# Patient Record
Sex: MALE | Race: WHITE | NOT HISPANIC OR LATINO | Employment: UNEMPLOYED | URBAN - METROPOLITAN AREA
[De-identification: names, ages, dates, MRNs, and addresses within clinical notes are randomized per-mention and may not be internally consistent; named-entity substitution may affect disease eponyms.]

---

## 2023-10-30 ENCOUNTER — APPOINTMENT (OUTPATIENT)
Dept: GENERAL RADIOLOGY | Facility: HOSPITAL | Age: 68
DRG: 539 | End: 2023-10-30
Payer: MEDICARE

## 2023-10-30 ENCOUNTER — APPOINTMENT (OUTPATIENT)
Dept: CT IMAGING | Facility: HOSPITAL | Age: 68
DRG: 539 | End: 2023-10-30
Payer: MEDICARE

## 2023-10-30 ENCOUNTER — HOSPITAL ENCOUNTER (INPATIENT)
Facility: HOSPITAL | Age: 68
LOS: 6 days | Discharge: NURSING, REHAB OR LONG-TERM CARE | DRG: 539 | End: 2023-11-06
Attending: EMERGENCY MEDICINE | Admitting: INTERNAL MEDICINE
Payer: MEDICARE

## 2023-10-30 DIAGNOSIS — L89.90 PRESSURE INJURY OF SKIN, UNSPECIFIED INJURY STAGE, UNSPECIFIED LOCATION: ICD-10-CM

## 2023-10-30 DIAGNOSIS — T83.511A URINARY TRACT INFECTION ASSOCIATED WITH INDWELLING URETHRAL CATHETER, INITIAL ENCOUNTER: ICD-10-CM

## 2023-10-30 DIAGNOSIS — R41.82 ALTERED MENTAL STATUS, UNSPECIFIED ALTERED MENTAL STATUS TYPE: Primary | ICD-10-CM

## 2023-10-30 DIAGNOSIS — N39.0 URINARY TRACT INFECTION ASSOCIATED WITH INDWELLING URETHRAL CATHETER, INITIAL ENCOUNTER: ICD-10-CM

## 2023-10-30 LAB
ALBUMIN SERPL-MCNC: 2.9 G/DL (ref 3.5–5.2)
ALBUMIN/GLOB SERPL: 0.6 G/DL
ALP SERPL-CCNC: 126 U/L (ref 39–117)
ALT SERPL W P-5'-P-CCNC: 18 U/L (ref 1–41)
ANION GAP SERPL CALCULATED.3IONS-SCNC: 8.7 MMOL/L (ref 5–15)
AST SERPL-CCNC: 28 U/L (ref 1–40)
BACTERIA UR QL AUTO: ABNORMAL /HPF
BASOPHILS # BLD AUTO: 0.07 10*3/MM3 (ref 0–0.2)
BASOPHILS NFR BLD AUTO: 0.7 % (ref 0–1.5)
BILIRUB SERPL-MCNC: 0.4 MG/DL (ref 0–1.2)
BILIRUB UR QL STRIP: NEGATIVE
BUN SERPL-MCNC: 13 MG/DL (ref 8–23)
BUN/CREAT SERPL: 18.8 (ref 7–25)
CALCIUM SPEC-SCNC: 9.3 MG/DL (ref 8.6–10.5)
CHLORIDE SERPL-SCNC: 104 MMOL/L (ref 98–107)
CLARITY UR: ABNORMAL
CO2 SERPL-SCNC: 28.3 MMOL/L (ref 22–29)
COLOR UR: ABNORMAL
CREAT SERPL-MCNC: 0.69 MG/DL (ref 0.76–1.27)
D-LACTATE SERPL-SCNC: 1 MMOL/L (ref 0.5–2)
DEPRECATED RDW RBC AUTO: 46.5 FL (ref 37–54)
EGFRCR SERPLBLD CKD-EPI 2021: 101.4 ML/MIN/1.73
EOSINOPHIL # BLD AUTO: 0.29 10*3/MM3 (ref 0–0.4)
EOSINOPHIL NFR BLD AUTO: 2.9 % (ref 0.3–6.2)
ERYTHROCYTE [DISTWIDTH] IN BLOOD BY AUTOMATED COUNT: 14.5 % (ref 12.3–15.4)
GEN 5 2HR TROPONIN T REFLEX: 38 NG/L
GLOBULIN UR ELPH-MCNC: 4.9 GM/DL
GLUCOSE SERPL-MCNC: 96 MG/DL (ref 65–99)
GLUCOSE UR STRIP-MCNC: NEGATIVE MG/DL
HCT VFR BLD AUTO: 32.5 % (ref 37.5–51)
HGB BLD-MCNC: 10.3 G/DL (ref 13–17.7)
HGB UR QL STRIP.AUTO: ABNORMAL
HYALINE CASTS UR QL AUTO: ABNORMAL /LPF
IMM GRANULOCYTES # BLD AUTO: 0.05 10*3/MM3 (ref 0–0.05)
IMM GRANULOCYTES NFR BLD AUTO: 0.5 % (ref 0–0.5)
KETONES UR QL STRIP: NEGATIVE
LEUKOCYTE ESTERASE UR QL STRIP.AUTO: ABNORMAL
LYMPHOCYTES # BLD AUTO: 2.48 10*3/MM3 (ref 0.7–3.1)
LYMPHOCYTES NFR BLD AUTO: 24.7 % (ref 19.6–45.3)
MCH RBC QN AUTO: 27.8 PG (ref 26.6–33)
MCHC RBC AUTO-ENTMCNC: 31.7 G/DL (ref 31.5–35.7)
MCV RBC AUTO: 87.8 FL (ref 79–97)
MONOCYTES # BLD AUTO: 0.93 10*3/MM3 (ref 0.1–0.9)
MONOCYTES NFR BLD AUTO: 9.3 % (ref 5–12)
NEUTROPHILS NFR BLD AUTO: 6.23 10*3/MM3 (ref 1.7–7)
NEUTROPHILS NFR BLD AUTO: 61.9 % (ref 42.7–76)
NITRITE UR QL STRIP: POSITIVE
NRBC BLD AUTO-RTO: 0 /100 WBC (ref 0–0.2)
PH UR STRIP.AUTO: 7 [PH] (ref 5–8)
PLATELET # BLD AUTO: 300 10*3/MM3 (ref 140–450)
PMV BLD AUTO: 10 FL (ref 6–12)
POTASSIUM SERPL-SCNC: 4.2 MMOL/L (ref 3.5–5.2)
PROT SERPL-MCNC: 7.8 G/DL (ref 6–8.5)
PROT UR QL STRIP: ABNORMAL
QT INTERVAL: 449 MS
QTC INTERVAL: 499 MS
RBC # BLD AUTO: 3.7 10*6/MM3 (ref 4.14–5.8)
RBC # UR STRIP: ABNORMAL /HPF
REF LAB TEST METHOD: ABNORMAL
SODIUM SERPL-SCNC: 141 MMOL/L (ref 136–145)
SP GR UR STRIP: 1.01 (ref 1–1.03)
SQUAMOUS #/AREA URNS HPF: ABNORMAL /HPF
TROPONIN T DELTA: 0 NG/L
TROPONIN T SERPL HS-MCNC: 38 NG/L
UROBILINOGEN UR QL STRIP: ABNORMAL
WBC # UR STRIP: ABNORMAL /HPF
WBC CLUMPS # UR AUTO: ABNORMAL /HPF
WBC NRBC COR # BLD: 10.05 10*3/MM3 (ref 3.4–10.8)

## 2023-10-30 PROCEDURE — 72192 CT PELVIS W/O DYE: CPT

## 2023-10-30 PROCEDURE — 25010000002 PIPERACILLIN SOD-TAZOBACTAM PER 1 G: Performed by: EMERGENCY MEDICINE

## 2023-10-30 PROCEDURE — 25010000002 VANCOMYCIN 10 G RECONSTITUTED SOLUTION: Performed by: EMERGENCY MEDICINE

## 2023-10-30 PROCEDURE — 87147 CULTURE TYPE IMMUNOLOGIC: CPT | Performed by: EMERGENCY MEDICINE

## 2023-10-30 PROCEDURE — 25810000003 SODIUM CHLORIDE 0.9 % SOLUTION: Performed by: EMERGENCY MEDICINE

## 2023-10-30 PROCEDURE — 70450 CT HEAD/BRAIN W/O DYE: CPT

## 2023-10-30 PROCEDURE — 87040 BLOOD CULTURE FOR BACTERIA: CPT | Performed by: INTERNAL MEDICINE

## 2023-10-30 PROCEDURE — 87186 SC STD MICRODIL/AGAR DIL: CPT | Performed by: EMERGENCY MEDICINE

## 2023-10-30 PROCEDURE — 87077 CULTURE AEROBIC IDENTIFY: CPT | Performed by: EMERGENCY MEDICINE

## 2023-10-30 PROCEDURE — 99285 EMERGENCY DEPT VISIT HI MDM: CPT

## 2023-10-30 PROCEDURE — 93005 ELECTROCARDIOGRAM TRACING: CPT | Performed by: EMERGENCY MEDICINE

## 2023-10-30 PROCEDURE — G0378 HOSPITAL OBSERVATION PER HR: HCPCS

## 2023-10-30 PROCEDURE — 71045 X-RAY EXAM CHEST 1 VIEW: CPT

## 2023-10-30 PROCEDURE — 25010000002 NALOXONE PER 1 MG: Performed by: EMERGENCY MEDICINE

## 2023-10-30 PROCEDURE — 36415 COLL VENOUS BLD VENIPUNCTURE: CPT

## 2023-10-30 PROCEDURE — 93010 ELECTROCARDIOGRAM REPORT: CPT | Performed by: INTERNAL MEDICINE

## 2023-10-30 PROCEDURE — P9612 CATHETERIZE FOR URINE SPEC: HCPCS

## 2023-10-30 PROCEDURE — 87205 SMEAR GRAM STAIN: CPT | Performed by: EMERGENCY MEDICINE

## 2023-10-30 PROCEDURE — 87070 CULTURE OTHR SPECIMN AEROBIC: CPT | Performed by: EMERGENCY MEDICINE

## 2023-10-30 PROCEDURE — 83605 ASSAY OF LACTIC ACID: CPT | Performed by: EMERGENCY MEDICINE

## 2023-10-30 PROCEDURE — 85025 COMPLETE CBC W/AUTO DIFF WBC: CPT | Performed by: EMERGENCY MEDICINE

## 2023-10-30 PROCEDURE — 87086 URINE CULTURE/COLONY COUNT: CPT | Performed by: EMERGENCY MEDICINE

## 2023-10-30 PROCEDURE — 25010000002 PIPERACILLIN SOD-TAZOBACTAM PER 1 G: Performed by: INTERNAL MEDICINE

## 2023-10-30 PROCEDURE — 80053 COMPREHEN METABOLIC PANEL: CPT | Performed by: EMERGENCY MEDICINE

## 2023-10-30 PROCEDURE — 25810000003 LACTATED RINGERS SOLUTION: Performed by: EMERGENCY MEDICINE

## 2023-10-30 PROCEDURE — 81001 URINALYSIS AUTO W/SCOPE: CPT | Performed by: EMERGENCY MEDICINE

## 2023-10-30 PROCEDURE — 25810000003 SODIUM CHLORIDE 0.9 % SOLUTION: Performed by: INTERNAL MEDICINE

## 2023-10-30 PROCEDURE — 84484 ASSAY OF TROPONIN QUANT: CPT | Performed by: EMERGENCY MEDICINE

## 2023-10-30 RX ORDER — BISACODYL 5 MG/1
5 TABLET, DELAYED RELEASE ORAL DAILY PRN
Status: DISCONTINUED | OUTPATIENT
Start: 2023-10-30 | End: 2023-11-06 | Stop reason: HOSPADM

## 2023-10-30 RX ORDER — AMOXICILLIN 250 MG
2 CAPSULE ORAL 2 TIMES DAILY
Status: DISCONTINUED | OUTPATIENT
Start: 2023-10-30 | End: 2023-10-30

## 2023-10-30 RX ORDER — VANCOMYCIN/0.9 % SOD CHLORIDE 1.5G/250ML
20 PLASTIC BAG, INJECTION (ML) INTRAVENOUS ONCE
Qty: 500 ML | Refills: 0 | Status: COMPLETED | OUTPATIENT
Start: 2023-10-30 | End: 2023-10-30

## 2023-10-30 RX ORDER — ONDANSETRON 2 MG/ML
4 INJECTION INTRAMUSCULAR; INTRAVENOUS EVERY 6 HOURS PRN
Status: DISCONTINUED | OUTPATIENT
Start: 2023-10-30 | End: 2023-11-06 | Stop reason: HOSPADM

## 2023-10-30 RX ORDER — SODIUM CHLORIDE 9 MG/ML
75 INJECTION, SOLUTION INTRAVENOUS CONTINUOUS
Status: DISCONTINUED | OUTPATIENT
Start: 2023-10-30 | End: 2023-11-06

## 2023-10-30 RX ORDER — UREA 10 %
3 LOTION (ML) TOPICAL NIGHTLY PRN
Status: DISCONTINUED | OUTPATIENT
Start: 2023-10-30 | End: 2023-11-06 | Stop reason: HOSPADM

## 2023-10-30 RX ORDER — ONDANSETRON 4 MG/1
4 TABLET, FILM COATED ORAL EVERY 6 HOURS PRN
Status: DISCONTINUED | OUTPATIENT
Start: 2023-10-30 | End: 2023-11-06 | Stop reason: HOSPADM

## 2023-10-30 RX ORDER — POLYETHYLENE GLYCOL 3350 17 G/17G
17 POWDER, FOR SOLUTION ORAL DAILY PRN
Status: DISCONTINUED | OUTPATIENT
Start: 2023-10-30 | End: 2023-11-06 | Stop reason: HOSPADM

## 2023-10-30 RX ORDER — ACETAMINOPHEN 325 MG/1
650 TABLET ORAL EVERY 4 HOURS PRN
Status: DISCONTINUED | OUTPATIENT
Start: 2023-10-30 | End: 2023-11-06 | Stop reason: HOSPADM

## 2023-10-30 RX ORDER — SODIUM CHLORIDE 0.9 % (FLUSH) 0.9 %
10 SYRINGE (ML) INJECTION AS NEEDED
Status: DISCONTINUED | OUTPATIENT
Start: 2023-10-30 | End: 2023-11-06 | Stop reason: HOSPADM

## 2023-10-30 RX ORDER — NALOXONE HCL 0.4 MG/ML
0.4 VIAL (ML) INJECTION ONCE
Status: COMPLETED | OUTPATIENT
Start: 2023-10-30 | End: 2023-10-30

## 2023-10-30 RX ORDER — VANCOMYCIN HYDROCHLORIDE 1 G/200ML
1000 INJECTION, SOLUTION INTRAVENOUS EVERY 12 HOURS
Status: DISCONTINUED | OUTPATIENT
Start: 2023-10-31 | End: 2023-11-02

## 2023-10-30 RX ORDER — BISACODYL 10 MG
10 SUPPOSITORY, RECTAL RECTAL DAILY PRN
Status: DISCONTINUED | OUTPATIENT
Start: 2023-10-30 | End: 2023-11-06 | Stop reason: HOSPADM

## 2023-10-30 RX ADMIN — SODIUM CHLORIDE, POTASSIUM CHLORIDE, SODIUM LACTATE AND CALCIUM CHLORIDE 1000 ML: 600; 310; 30; 20 INJECTION, SOLUTION INTRAVENOUS at 17:30

## 2023-10-30 RX ADMIN — VANCOMYCIN HYDROCHLORIDE 1500 MG: 10 INJECTION, POWDER, LYOPHILIZED, FOR SOLUTION INTRAVENOUS at 17:49

## 2023-10-30 RX ADMIN — SODIUM CHLORIDE 75 ML/HR: 9 INJECTION, SOLUTION INTRAVENOUS at 20:32

## 2023-10-30 RX ADMIN — PIPERACILLIN SODIUM AND TAZOBACTAM SODIUM 3.38 G: 3; .375 INJECTION, SOLUTION INTRAVENOUS at 17:21

## 2023-10-30 RX ADMIN — PIPERACILLIN SODIUM AND TAZOBACTAM SODIUM 3.38 G: 3; .375 INJECTION, SOLUTION INTRAVENOUS at 23:41

## 2023-10-30 RX ADMIN — NALXONE HYDROCHLORIDE 0.4 MG: 0.4 INJECTION INTRAMUSCULAR; INTRAVENOUS; SUBCUTANEOUS at 15:21

## 2023-10-30 NOTE — CASE MANAGEMENT/SOCIAL WORK
Patient with legal guardian on file and is a [place] of the [place]. Registration obtained consent to treat from Guardianship Office. No paperwork has been scanned into Epic. Banner present upon chart review. Adriane AGUILAR RN CCP manager, updated per policy. CHRISTINE HuizarN RN

## 2023-10-30 NOTE — ED NOTES
Nursing report ED to floor  Misael Sharp  68 y.o.  male    HPI :   Chief Complaint   Patient presents with    Weakness - Generalized     From [facility]       Admitting doctor:   Antionette Rodriguez MD    Admitting diagnosis:   The primary encounter diagnosis was Altered mental status, unspecified altered mental status type. Diagnoses of Pressure injury of skin, unspecified injury stage, unspecified location and Urinary tract infection associated with indwelling urethral catheter, initial encounter were also pertinent to this visit.    Code status:   Current Code Status       Date Active Code Status Order ID Comments User Context       10/30/2023 1745 CPR (Attempt to Resuscitate) [id number]  Antionette Rodriguez MD ED        Question Answer    Code Status (Patient has no pulse and is not breathing) CPR (Attempt to Resuscitate)    Medical Interventions (Patient has pulse or is breathing) Full                    Allergies:   Patient has no known allergies.    Isolation:   No active isolations    Intake and Output    Intake/Output Summary (Last 24 hours) at 10/30/2023 1757  Last data filed at 10/30/2023 1748  Gross per 24 hour   Intake 50 ml   Output --   Net 50 ml       Weight:       10/30/23  1551   Weight: 79.3 kg (174 lb 12.8 oz)       Most recent vitals:   Vitals:    10/30/23 1601 10/30/23 1621 10/30/23 1626 10/30/23 1701   BP: 99/69   99/64   Patient Position:       Pulse:  73 67 72   Resp:       Temp:       TempSrc:       SpO2:   96% 97%   Weight:           Active LDAs/IV Access:   Lines, Drains & Airways       Active LDAs       Name Placement date Placement time Site Days    Peripheral IV 10/30/23 1521 Posterior;Right Hand 10/30/23  1521  Hand  less than 1    Peripheral IV 10/30/23 1730 Left;Posterior Wrist 10/30/23  1730  Wrist  less than 1    Urethral Catheter Coude 20 Fr. 10/30/23  1550  -- less than 1                    Labs (abnormal labs have a star):   Labs Reviewed   COMPREHENSIVE  METABOLIC PANEL - Abnormal; Notable for the following components:       Result Value    Creatinine 0.69 (*)     Albumin 2.9 (*)     Alkaline Phosphatase 126 (*)     All other components within normal limits    Narrative:     GFR Normal >60  Chronic Kidney Disease <60  Kidney Failure <15     URINALYSIS W/ CULTURE IF INDICATED - Abnormal; Notable for the following components:    Color, UA Orange (*)     Appearance, UA Turbid (*)     Blood, UA Large (3+) (*)     Protein, UA 30 mg/dL (1+) (*)     Leuk Esterase, UA Large (3+) (*)     Nitrite, UA Positive (*)     All other components within normal limits    Narrative:     In absence of clinical symptoms, the presence of pyuria, bacteria, and/or nitrites on the urinalysis result does not correlate with infection.   TROPONIN - Abnormal; Notable for the following components:    HS Troponin T 38 (*)     All other components within normal limits    Narrative:     High Sensitive Troponin T Reference Range:  <10.0 ng/L- Negative Female for AMI  <15.0 ng/L- Negative Male for AMI  >=10 - Abnormal Female indicating possible myocardial injury.  >=15 - Abnormal Male indicating possible myocardial injury.   Clinicians would have to utilize clinical acumen, EKG, Troponin, and serial changes to determine if it is an Acute Myocardial Infarction or myocardial injury due to an underlying chronic condition.        CBC WITH AUTO DIFFERENTIAL - Abnormal; Notable for the following components:    RBC 3.70 (*)     Hemoglobin 10.3 (*)     Hematocrit 32.5 (*)     Monocytes, Absolute 0.93 (*)     All other components within normal limits   HIGH SENSITIVITIY TROPONIN T 2HR - Abnormal; Notable for the following components:    HS Troponin T 38 (*)     All other components within normal limits    Narrative:     High Sensitive Troponin T Reference Range:  <10.0 ng/L- Negative Female for AMI  <15.0 ng/L- Negative Male for AMI  >=10 - Abnormal Female indicating possible myocardial injury.  >=15 - Abnormal  Male indicating possible myocardial injury.   Clinicians would have to utilize clinical acumen, EKG, Troponin, and serial changes to determine if it is an Acute Myocardial Infarction or myocardial injury due to an underlying chronic condition.        URINALYSIS, MICROSCOPIC ONLY - Abnormal; Notable for the following components:    RBC, UA 21-50 (*)     WBC, UA Too Numerous to Count (*)     Bacteria, UA 3+ (*)     All other components within normal limits   LACTIC ACID, PLASMA - Normal   WOUND CULTURE   WOUND CULTURE   WOUND CULTURE   WOUND CULTURE   URINE CULTURE   CBC AND DIFFERENTIAL    Narrative:     The following orders were created for panel order CBC & Differential.  Procedure                               Abnormality         Status                     ---------                               -----------         ------                     CBC Auto Differential[[id number]]        Abnormal            Final result                 Please view results for these tests on the individual orders.       EKG:   ECG 12 Lead Altered Mental Status   Final Result   HEART RATE= 74  bpm   RR Interval= 811  ms   CO Interval=   ms   P Horizontal Axis=   deg   P Front Axis=   deg   QRSD Interval= 118  ms   QT Interval= 449  ms   QTcB= 499  ms   QRS Axis= -7  deg   T Wave Axis= 47  deg   - ABNORMAL ECG -   Sinus rhythm   Incomplete left bundle branch block   No Prior Tracing for Comparison   Electronically Signed By: Isabell Lynch ([facility]) 30-Oct-2023 15:58:27   Date and Time of Study: 2023-10-30 15:40:37          Meds given in ED:   Medications   sodium chloride 0.9 % flush 10 mL (has no administration in time range)   lactated ringers bolus 1,000 mL (1,000 mL Intravenous New Bag 10/30/23 1730)   vancomycin IVPB 1500 mg in 0.9% NaCl (Premix) 500 mL (1,500 mg Intravenous New Bag 10/30/23 2789)   acetaminophen (TYLENOL) tablet 650 mg (has no administration in time range)   sennosides-docusate (PERICOLACE) 8.6-50 MG per tablet 2 tablet  (has no administration in time range)     And   polyethylene glycol (MIRALAX) packet 17 g (has no administration in time range)     And   bisacodyl (DULCOLAX) EC tablet 5 mg (has no administration in time range)     And   bisacodyl (DULCOLAX) suppository 10 mg (has no administration in time range)   ondansetron (ZOFRAN) tablet 4 mg (has no administration in time range)     Or   ondansetron (ZOFRAN) injection 4 mg (has no administration in time range)   melatonin tablet 3 mg (has no administration in time range)   naloxone (NARCAN) injection 0.4 mg (0.4 mg Intravenous Given 10/30/23 1521)   piperacillin-tazobactam (ZOSYN) 3.375 g in iso-osmotic dextrose 50 ml (premix) (0 g Intravenous Stopped 10/30/23 1748)       Imaging results:  CT Pelvis Without Contrast    Result Date: 10/30/2023   As described.    This report was finalized on 10/30/2023 5:02 PM by Dr. Chiki Pepper M.D on Workstation: [id number]      CT Head Without Contrast    Result Date: 10/30/2023  No acute process is identified. Further evaluation could be performed with MRI examination of the brain as indicated.    Radiation dose reduction techniques were utilized, including automated exposure control and exposure modulation based on body size.       XR Chest 1 View    Result Date: 10/30/2023  1. Borderline cardiomegaly. 2. Elevated left hemidiaphragm.   This report was finalized on 10/30/2023 4:15 PM by Dr. Willie Sosa M.D on Workstation: [id number]       Ambulatory status:   - not ambulatory    Social issues:   Social History     Socioeconomic History    Marital status: Unknown       NIH Stroke Scale:       Rian Conklin RN  10/30/23 17:57 EDT

## 2023-10-30 NOTE — ED NOTES
Blood glucose 99. MD aware.    Minoxidil Counseling: Minoxidil is a topical medication which can increase blood flow where it is applied. It is uncertain how this medication increases hair growth. Side effects are uncommon and include stinging and allergic reactions.

## 2023-10-30 NOTE — ED PROVIDER NOTES
EMERGENCY DEPARTMENT ENCOUNTER    Room Number:  16/16  PCP: No primary care provider on file.  No care team member to display   Independent Historians: EMS    HPI:  Chief Complaint: Altered mental status    A complete HPI/ROS/PMH/PSH/SH/FH are unobtainable due to: Altered mental status    Chronic or social conditions impacting patient care (Social Determinants of Health): Chronically dependent on nursing home care  (Financial Resource Strain / Food Insecurity / Transportation Needs / Physical Activity / Stress / Social Connections / Intimate Partner Violence / Housing Stability)    Context: Misael Sharp is a 68 y.o. male who presents to the ED c/o acute altered mental status.  EMS reports the patient is baseline alert and oriented x4.  For the last hour he has had altered mental status.  There is no history of trauma.  The patient is not able to provide any history.  He does have a chronic indwelling Villela catheter.    Review of prior external notes (non-ED) -and- Review of prior external test results outside of this encounter: PDMP shows recent fills for opiates as well as gabapentin.  Extensive review of the EPIC system as well as [facility] reveals no prior visit notes and no prior diagnostic studies available for review.    Prescription drug monitoring program review: [facility] reviewed by Karri Gabriel MD       PAST MEDICAL HISTORY  Active Ambulatory Problems     Diagnosis Date Noted    No Active Ambulatory Problems     Resolved Ambulatory Problems     Diagnosis Date Noted    No Resolved Ambulatory Problems     No Additional Past Medical History         PAST SURGICAL HISTORY  No past surgical history on file.      FAMILY HISTORY  No family history on file.      SOCIAL HISTORY         ALLERGIES  Patient has no known allergies.        REVIEW OF SYSTEMS  Review of Systems  Included in HPI  All systems reviewed and negative except for those discussed in HPI.      PHYSICAL EXAM    I have reviewed the triage  vital signs and nursing notes.    ED Triage Vitals [10/30/23 1515]   Temp Heart Rate Resp BP SpO2   -- 73 22 97/79 96 %      Temp src Heart Rate Source Patient Position BP Location FiO2 (%)   -- -- -- -- --       Physical Exam  GENERAL: Acute and chronically ill-appearing.  Responds to pain.  Moves all extremities.  Equal facial grimace.  Gag reflex intact.  SKIN: Warm, dry  HENT: Normocephalic, atraumatic  EYES: no scleral icterus, pupils 2 mm bilaterally  CV: regular rhythm, regular rate  RESPIRATORY: normal effort, lungs clear  ABDOMEN: soft, nontender, nondistended.  Villela catheter in place.  MUSCULOSKELETAL: no deformity.  Right heel wound is bandaged with clean bandage.  3 separate sacral ulcers are present.  NEURO: alert, moves all extremities, follows commands                                                               LAB RESULTS  No results found for this or any previous visit (from the past 24 hour(s)).    Ordered the above labs and independently reviewed the results.        RADIOLOGY  No Radiology Exams Resulted Within Past 24 Hours    I ordered the above noted radiological studies. Reviewed by me and discussed with radiologist.  See dictation for official radiology interpretation.      PROCEDURES    Procedures      MEDICATIONS GIVEN IN ER    Medications   sodium chloride 0.9 % flush 10 mL (has no administration in time range)   naloxone (NARCAN) injection 0.4 mg (has no administration in time range)         ORDERS PLACED DURING THIS VISIT:  Orders Placed This Encounter   Procedures    XR Chest 1 View    CT Head Without Contrast    Comprehensive Metabolic Panel    Urinalysis With Culture If Indicated - Urine, Catheter    Lactic Acid, Plasma    High Sensitivity Troponin T    CBC Auto Differential    Replace Villela Catheter    Assess Need for Indwelling Urinary Catheter - Follow Removal Protocol    Urinary Catheter Care    Monitor Blood Pressure    Pulse Oximetry, Continuous    ECG 12 Lead Altered Mental  Status    Insert Peripheral IV    CBC & Differential         PROGRESS, DATA ANALYSIS, CONSULTS, AND MEDICAL DECISION MAKING    All labs have been independently interpreted by me.  All radiology studies have been reviewed by me and discussed with radiologist dictating the report.   EKG's independently viewed and interpreted by me.  Discussion below represents my analysis of pertinent findings related to patient's condition, differential diagnosis, treatment plan and final disposition.    Differential diagnosis includes but is not limited to UTI, pneumonia, sepsis, opiate overdose, intracranial hemorrhage, stroke.    ED Course as of 10/30/23 1752   Mon Oct 30, 2023   1536 The patient has no focal deficits to suggest acute ischemic stroke.  This appears more metabolic in nature. [TR]   1542 EKG          EKG time: 1540  Rhythm/Rate: Normal sinus, rate 70  P waves and FL: Normal P, normal FL  QRS, axis: Left bundle branch block, normal axis  ST and T waves: No acute    Independently Interpreted by me  No prior EKG available for comparison   [TR]   1615 XR Chest 1 View  My independent interpretation of the chest x-ray is left basilar opacity [TR]   1705 Discussing with Dr. Rodriguez with A.  She agrees to admit. [TR]   1709 Reevaluated patient.  He has an intact gag reflex.  He is a full code.  He awakens with vigorous stimulation. [TR]      ED Course User Index  [TR] Karri Gabriel MD       I interpreted the cardiac monitor rhythm and my independent interpretation is: normal sinus rhythm, rate of 70. The patient presents with altered mental status and the cardiac monitor was ordered to monitor for arrhythmias and acute change in clinical status.          AS OF 15:19 EDT VITALS:    BP - 97/79  HR - 73  TEMP -    O2 SATS - 96%    Critical care provider statement:     Critical care time (minutes): 30-74.    Critical care time was exclusive of:  Separately billable procedures and treating other patients    Critical care was  necessary to treat or prevent imminent or life-threatening deterioration of the following conditions: CNS failure    Critical care was time spent personally by me on the following activities:  Development of treatment plan with patient or surrogate, discussions with consultants, evaluation of patient's response to treatment, examination of patient, obtaining history from patient or surrogate, ordering and performing treatments and interventions, ordering and review of laboratory studies, ordering and review of radiographic studies, pulse oximetry, re-evaluation of patient's condition and review of old charts    DIAGNOSIS  Final diagnoses:   None         DISPOSITION  ED Disposition       None               Note Disclaimer: At [facility], we believe that sharing information builds trust and better relationships. You are receiving this note because you recently visited [facility]. It is possible you will see health information before a provider has talked with you about it. This kind of information can be easy to misunderstand. To help you fully understand what it means for your health, we urge you to discuss this note with your provider.         Karri Gabriel MD  10/30/23 3650

## 2023-10-31 PROBLEM — D64.9 ANEMIA: Status: ACTIVE | Noted: 2023-10-31

## 2023-10-31 PROBLEM — L89.90 DECUBITUS ULCER: Status: ACTIVE | Noted: 2023-10-31

## 2023-10-31 PROBLEM — R79.89 ELEVATED TROPONIN: Status: ACTIVE | Noted: 2023-10-31

## 2023-10-31 PROBLEM — M46.28 SACRAL OSTEOMYELITIS: Status: ACTIVE | Noted: 2023-10-31

## 2023-10-31 LAB
ANION GAP SERPL CALCULATED.3IONS-SCNC: 10 MMOL/L (ref 5–15)
BACTERIA SPEC AEROBE CULT: NORMAL
BUN SERPL-MCNC: 10 MG/DL (ref 8–23)
BUN/CREAT SERPL: 15.6 (ref 7–25)
CALCIUM SPEC-SCNC: 8.8 MG/DL (ref 8.6–10.5)
CHLORIDE SERPL-SCNC: 107 MMOL/L (ref 98–107)
CO2 SERPL-SCNC: 25 MMOL/L (ref 22–29)
CREAT SERPL-MCNC: 0.64 MG/DL (ref 0.76–1.27)
DEPRECATED RDW RBC AUTO: 43.9 FL (ref 37–54)
EGFRCR SERPLBLD CKD-EPI 2021: 103.8 ML/MIN/1.73
ERYTHROCYTE [DISTWIDTH] IN BLOOD BY AUTOMATED COUNT: 14.1 % (ref 12.3–15.4)
GLUCOSE BLDC GLUCOMTR-MCNC: 105 MG/DL (ref 70–130)
GLUCOSE BLDC GLUCOMTR-MCNC: 116 MG/DL (ref 70–130)
GLUCOSE SERPL-MCNC: 102 MG/DL (ref 65–99)
HCT VFR BLD AUTO: 28.5 % (ref 37.5–51)
HGB BLD-MCNC: 9.2 G/DL (ref 13–17.7)
MCH RBC QN AUTO: 27.9 PG (ref 26.6–33)
MCHC RBC AUTO-ENTMCNC: 32.3 G/DL (ref 31.5–35.7)
MCV RBC AUTO: 86.4 FL (ref 79–97)
PLATELET # BLD AUTO: 316 10*3/MM3 (ref 140–450)
PMV BLD AUTO: 9.9 FL (ref 6–12)
POTASSIUM SERPL-SCNC: 3.7 MMOL/L (ref 3.5–5.2)
RBC # BLD AUTO: 3.3 10*6/MM3 (ref 4.14–5.8)
SODIUM SERPL-SCNC: 142 MMOL/L (ref 136–145)
WBC NRBC COR # BLD: 11.38 10*3/MM3 (ref 3.4–10.8)

## 2023-10-31 PROCEDURE — 80048 BASIC METABOLIC PNL TOTAL CA: CPT | Performed by: INTERNAL MEDICINE

## 2023-10-31 PROCEDURE — 99221 1ST HOSP IP/OBS SF/LOW 40: CPT | Performed by: SURGERY

## 2023-10-31 PROCEDURE — 25010000002 VANCOMYCIN PER 500 MG: Performed by: INTERNAL MEDICINE

## 2023-10-31 PROCEDURE — 87102 FUNGUS ISOLATION CULTURE: CPT | Performed by: STUDENT IN AN ORGANIZED HEALTH CARE EDUCATION/TRAINING PROGRAM

## 2023-10-31 PROCEDURE — 25010000002 ENOXAPARIN PER 10 MG: Performed by: STUDENT IN AN ORGANIZED HEALTH CARE EDUCATION/TRAINING PROGRAM

## 2023-10-31 PROCEDURE — 25010000002 PIPERACILLIN SOD-TAZOBACTAM PER 1 G: Performed by: INTERNAL MEDICINE

## 2023-10-31 PROCEDURE — 36415 COLL VENOUS BLD VENIPUNCTURE: CPT | Performed by: INTERNAL MEDICINE

## 2023-10-31 PROCEDURE — 25810000003 SODIUM CHLORIDE 0.9 % SOLUTION: Performed by: INTERNAL MEDICINE

## 2023-10-31 PROCEDURE — 82948 REAGENT STRIP/BLOOD GLUCOSE: CPT

## 2023-10-31 PROCEDURE — 85027 COMPLETE CBC AUTOMATED: CPT | Performed by: INTERNAL MEDICINE

## 2023-10-31 RX ORDER — FUROSEMIDE 20 MG/1
20 TABLET ORAL DAILY
Status: DISCONTINUED | OUTPATIENT
Start: 2023-10-31 | End: 2023-11-06 | Stop reason: HOSPADM

## 2023-10-31 RX ORDER — ATORVASTATIN CALCIUM 10 MG/1
10 TABLET, FILM COATED ORAL NIGHTLY
COMMUNITY

## 2023-10-31 RX ORDER — ASCORBIC ACID 500 MG
1000 TABLET ORAL DAILY
COMMUNITY

## 2023-10-31 RX ORDER — ASCORBIC ACID 500 MG
1000 TABLET ORAL DAILY
Status: DISCONTINUED | OUTPATIENT
Start: 2023-10-31 | End: 2023-11-06 | Stop reason: HOSPADM

## 2023-10-31 RX ORDER — TIZANIDINE 4 MG/1
2 TABLET ORAL EVERY 8 HOURS PRN
Status: DISCONTINUED | OUTPATIENT
Start: 2023-10-31 | End: 2023-11-06 | Stop reason: HOSPADM

## 2023-10-31 RX ORDER — HYDROCODONE BITARTRATE AND ACETAMINOPHEN 5; 325 MG/1; MG/1
1 TABLET ORAL EVERY 4 HOURS PRN
Status: DISCONTINUED | OUTPATIENT
Start: 2023-10-31 | End: 2023-11-06 | Stop reason: HOSPADM

## 2023-10-31 RX ORDER — MELATONIN
2000 DAILY
COMMUNITY

## 2023-10-31 RX ORDER — HYDROCORTISONE 10 MG/ML
1 LOTION TOPICAL 3 TIMES DAILY
COMMUNITY
End: 2023-11-06 | Stop reason: HOSPADM

## 2023-10-31 RX ORDER — HYOSCYAMINE SULFATE 16 OZ
1 SOLUTION MISCELLANEOUS EVERY 12 HOURS SCHEDULED
COMMUNITY

## 2023-10-31 RX ORDER — UREA 10 %
1 LOTION (ML) TOPICAL DAILY
COMMUNITY

## 2023-10-31 RX ORDER — DOCUSATE SODIUM 100 MG/1
100 CAPSULE, LIQUID FILLED ORAL DAILY
COMMUNITY

## 2023-10-31 RX ORDER — POTASSIUM CHLORIDE 750 MG/1
10 CAPSULE, EXTENDED RELEASE ORAL DAILY
COMMUNITY
End: 2023-11-06 | Stop reason: HOSPADM

## 2023-10-31 RX ORDER — FERROUS SULFATE 325(65) MG
325 TABLET ORAL
Status: DISCONTINUED | OUTPATIENT
Start: 2023-11-01 | End: 2023-11-06 | Stop reason: HOSPADM

## 2023-10-31 RX ORDER — SENNOSIDES A AND B 8.6 MG/1
2 TABLET, FILM COATED ORAL 2 TIMES DAILY
COMMUNITY

## 2023-10-31 RX ORDER — GABAPENTIN 300 MG/1
300 CAPSULE ORAL 3 TIMES DAILY
Status: DISCONTINUED | OUTPATIENT
Start: 2023-10-31 | End: 2023-11-02

## 2023-10-31 RX ORDER — MULTIPLE VITAMINS W/ MINERALS TAB 9MG-400MCG
1 TAB ORAL DAILY
Status: DISCONTINUED | OUTPATIENT
Start: 2023-10-31 | End: 2023-11-06 | Stop reason: HOSPADM

## 2023-10-31 RX ORDER — QUINIDINE GLUCONATE 324 MG
240 TABLET, EXTENDED RELEASE ORAL
COMMUNITY

## 2023-10-31 RX ORDER — GABAPENTIN 100 MG/1
300 CAPSULE ORAL 3 TIMES DAILY
COMMUNITY
End: 2023-11-06 | Stop reason: HOSPADM

## 2023-10-31 RX ORDER — SODIUM HYPOCHLORITE 1.25 MG/ML
SOLUTION TOPICAL EVERY 12 HOURS
Status: DISCONTINUED | OUTPATIENT
Start: 2023-10-31 | End: 2023-11-06 | Stop reason: HOSPADM

## 2023-10-31 RX ORDER — FOLIC ACID 1 MG/1
2 TABLET ORAL DAILY
COMMUNITY

## 2023-10-31 RX ORDER — MULTIPLE VITAMINS W/ MINERALS TAB 9MG-400MCG
1 TAB ORAL DAILY
COMMUNITY

## 2023-10-31 RX ORDER — FOLIC ACID 1 MG/1
2 TABLET ORAL DAILY
Status: DISCONTINUED | OUTPATIENT
Start: 2023-10-31 | End: 2023-11-06 | Stop reason: HOSPADM

## 2023-10-31 RX ORDER — ATORVASTATIN CALCIUM 20 MG/1
10 TABLET, FILM COATED ORAL NIGHTLY
Status: DISCONTINUED | OUTPATIENT
Start: 2023-10-31 | End: 2023-11-06 | Stop reason: HOSPADM

## 2023-10-31 RX ORDER — FUROSEMIDE 20 MG/1
20 TABLET ORAL DAILY
COMMUNITY

## 2023-10-31 RX ORDER — TIZANIDINE 2 MG/1
2 TABLET ORAL EVERY 8 HOURS PRN
COMMUNITY

## 2023-10-31 RX ORDER — POLYETHYLENE GLYCOL 3350 17 G/17G
17 POWDER, FOR SOLUTION ORAL DAILY PRN
COMMUNITY

## 2023-10-31 RX ORDER — ZINC SULFATE 50(220)MG
220 CAPSULE ORAL DAILY
Status: DISCONTINUED | OUTPATIENT
Start: 2023-10-31 | End: 2023-11-06 | Stop reason: HOSPADM

## 2023-10-31 RX ORDER — BACLOFEN 20 MG/1
20 TABLET ORAL 3 TIMES DAILY
COMMUNITY
End: 2023-11-06 | Stop reason: HOSPADM

## 2023-10-31 RX ORDER — ACETAMINOPHEN 325 MG/1
650 TABLET ORAL EVERY 8 HOURS PRN
COMMUNITY

## 2023-10-31 RX ORDER — DOCUSATE SODIUM 100 MG/1
100 CAPSULE, LIQUID FILLED ORAL DAILY
Status: DISCONTINUED | OUTPATIENT
Start: 2023-10-31 | End: 2023-11-06 | Stop reason: HOSPADM

## 2023-10-31 RX ORDER — BISACODYL 10 MG
10 SUPPOSITORY, RECTAL RECTAL DAILY PRN
COMMUNITY

## 2023-10-31 RX ORDER — SENNOSIDES A AND B 8.6 MG/1
2 TABLET, FILM COATED ORAL 2 TIMES DAILY
Status: DISCONTINUED | OUTPATIENT
Start: 2023-10-31 | End: 2023-11-06 | Stop reason: HOSPADM

## 2023-10-31 RX ORDER — ENOXAPARIN SODIUM 100 MG/ML
40 INJECTION SUBCUTANEOUS EVERY 24 HOURS
Status: DISCONTINUED | OUTPATIENT
Start: 2023-10-31 | End: 2023-11-01

## 2023-10-31 RX ORDER — HYDROCODONE BITARTRATE AND ACETAMINOPHEN 5; 325 MG/1; MG/1
1 TABLET ORAL EVERY 4 HOURS PRN
COMMUNITY

## 2023-10-31 RX ORDER — MELATONIN
2000 DAILY
Status: DISCONTINUED | OUTPATIENT
Start: 2023-10-31 | End: 2023-11-06 | Stop reason: HOSPADM

## 2023-10-31 RX ORDER — BACLOFEN 10 MG/1
20 TABLET ORAL 3 TIMES DAILY
Status: DISCONTINUED | OUTPATIENT
Start: 2023-10-31 | End: 2023-11-03

## 2023-10-31 RX ORDER — IPRATROPIUM BROMIDE AND ALBUTEROL SULFATE 2.5; .5 MG/3ML; MG/3ML
3 SOLUTION RESPIRATORY (INHALATION) EVERY 6 HOURS PRN
Status: DISCONTINUED | OUTPATIENT
Start: 2023-10-31 | End: 2023-11-06 | Stop reason: HOSPADM

## 2023-10-31 RX ADMIN — ACETAMINOPHEN 650 MG: 325 TABLET ORAL at 09:43

## 2023-10-31 RX ADMIN — Medication 100 MG: at 18:32

## 2023-10-31 RX ADMIN — ATORVASTATIN CALCIUM 10 MG: 20 TABLET, FILM COATED ORAL at 21:31

## 2023-10-31 RX ADMIN — DOCUSATE SODIUM 100 MG: 100 CAPSULE, LIQUID FILLED ORAL at 18:33

## 2023-10-31 RX ADMIN — Medication 2000 UNITS: at 18:32

## 2023-10-31 RX ADMIN — VANCOMYCIN HYDROCHLORIDE 1000 MG: 1 INJECTION, SOLUTION INTRAVENOUS at 06:54

## 2023-10-31 RX ADMIN — Medication 10 ML: at 21:31

## 2023-10-31 RX ADMIN — PIPERACILLIN SODIUM AND TAZOBACTAM SODIUM 3.38 G: 3; .375 INJECTION, SOLUTION INTRAVENOUS at 09:43

## 2023-10-31 RX ADMIN — GABAPENTIN 300 MG: 300 CAPSULE ORAL at 21:31

## 2023-10-31 RX ADMIN — BACLOFEN 20 MG: 10 TABLET ORAL at 21:35

## 2023-10-31 RX ADMIN — VANCOMYCIN HYDROCHLORIDE 1000 MG: 1 INJECTION, SOLUTION INTRAVENOUS at 18:30

## 2023-10-31 RX ADMIN — FUROSEMIDE 20 MG: 20 TABLET ORAL at 18:32

## 2023-10-31 RX ADMIN — SODIUM CHLORIDE 75 ML/HR: 9 INJECTION, SOLUTION INTRAVENOUS at 10:18

## 2023-10-31 RX ADMIN — MULTIPLE VITAMINS W/ MINERALS TAB 1 TABLET: TAB at 18:32

## 2023-10-31 RX ADMIN — OXYCODONE HYDROCHLORIDE AND ACETAMINOPHEN 1000 MG: 500 TABLET ORAL at 18:33

## 2023-10-31 RX ADMIN — Medication 220 MG: at 18:33

## 2023-10-31 RX ADMIN — FOLIC ACID 2 MG: 1 TABLET ORAL at 18:32

## 2023-10-31 RX ADMIN — HYDROCODONE BITARTRATE AND ACETAMINOPHEN 1 TABLET: 5; 325 TABLET ORAL at 23:54

## 2023-10-31 RX ADMIN — ENOXAPARIN SODIUM 40 MG: 100 INJECTION SUBCUTANEOUS at 18:33

## 2023-10-31 RX ADMIN — PIPERACILLIN SODIUM AND TAZOBACTAM SODIUM 3.38 G: 3; .375 INJECTION, SOLUTION INTRAVENOUS at 16:53

## 2023-10-31 NOTE — NURSING NOTE
Oriented to name bday and place. Disoriented to situation. Left heel pain 6/10. New dressing to this heel and heels floated. States he uses electric wheelchair at [employer].  Patient with multiple wounds including x 2 to coccyx area. Cleaned and placed new dressings. Wound care nurse to see. Will await her recommendations and monitor stool output, but considering requesting rectal tube to protect wounds from stool.  Assisted with patient bath. Fed him breakfast. New stat lock to butcher. He is now on contact isolation due to pending candida aureus testing.

## 2023-10-31 NOTE — PLAN OF CARE
Goal Outcome Evaluation:  Iv antibiotics, seen by wound care and orders received. On lawrence mattress. Contact for candida aurus. New infectious disease consult. New surgery consult for wounds. New iv placed today. Patient is total care, needs to be fed.

## 2023-10-31 NOTE — H&P
"HISTORY AND PHYSICAL   [facility]        Date of Admission: 10/30/2023  Patient Identification:  Name: Misael Shapr  Age: 68 y.o.  Sex: male  :  1955  MRN: [id number]                     Primary Care Physician: Linus Martinez MD    Chief Complaint:  68 year old gentleman was brought in with altered mental status; he resides at [facility]; he is somnolent and not able to provide any history; he responds to vigorous stimulation    History of Present Illness:   As above    Past Medical History:  Not obtainable at present  Past Surgical History:  Not obtainable at present   Home Meds:  Will review once list is available       Allergies:  No Known Allergies  Immunizations:    There is no immunization history on file for this patient.  Social History:   Social History     Social History Narrative    [facility]ives [facility]     Social History     Socioeconomic History    Marital status: Unknown       Family History:  No family history on file.     Review of Systems  Not obtainable    Objective:  T Max 24 hrs: Temp (24hrs), Av.7 °F (36.5 °C), Min:97.3 °F (36.3 °C), Max:98.1 °F (36.7 °C)    Vitals Ranges:   Temp:  [97.3 °F (36.3 °C)-98.1 °F (36.7 °C)] 98.1 °F (36.7 °C)  Heart Rate:  [67-96] 75  Resp:  [18-22] 18  BP: ()/(56-79) 117/68      Exam:  /68 (BP Location: Left arm, Patient Position: Lying)   Pulse 75   Temp 98.1 °F (36.7 °C) (Oral)   Resp 18   Ht 172.7 cm (68\")   Wt 79.3 kg (174 lb 12.8 oz)   SpO2 99%   BMI 26.58 kg/m²     General Appearance:    somnolent, no distress, appears stated age; frail, chronically ill   Head:    Normocephalic, without obvious abnormality, atraumatic;   Eyes:    both eyes closed   Ears:    Normal external ear canals, both ears   Nose:   Nares normal, septum midline, mucosa normal, no drainage    or sinus tenderness   Throat:   Lips, mucosa, and tongue normal   Neck:   Supple, symmetrical, trachea midline, no adenopathy;     thyroid:  no " enlargement/tenderness/nodules; no carotid    bruit or JVD   Back:     Symmetric, no curvature, ROM normal, no CVA tenderness   Lungs:     Decreased breath sounds bilaterally, respirations unlabored   Chest Wall:    No tenderness or deformity    Heart:    Regular rate and rhythm, S1 and S2 normal, no murmur, rub   or gallop   Abdomen:     Soft, nontender, bowel sounds active all four quadrants,     no masses, no hepatomegaly, no splenomegaly   Extremities:   Extremities normal, atraumatic, no cyanosis or edema                       .    Data Review:  Labs in chart were reviewed.  WBC   Date Value Ref Range Status   10/30/2023 10.05 3.40 - 10.80 10*3/mm3 Final     Hemoglobin   Date Value Ref Range Status   10/30/2023 10.3 (L) 13.0 - 17.7 g/dL Final     Hematocrit   Date Value Ref Range Status   10/30/2023 32.5 (L) 37.5 - 51.0 % Final     Platelets   Date Value Ref Range Status   10/30/2023 300 140 - 450 10*3/mm3 Final     Sodium   Date Value Ref Range Status   10/30/2023 141 136 - 145 mmol/L Final     Potassium   Date Value Ref Range Status   10/30/2023 4.2 3.5 - 5.2 mmol/L Final     Comment:     Slight hemolysis detected by analyzer. Results may be affected.     Chloride   Date Value Ref Range Status   10/30/2023 104 98 - 107 mmol/L Final     CO2   Date Value Ref Range Status   10/30/2023 28.3 22.0 - 29.0 mmol/L Final     BUN   Date Value Ref Range Status   10/30/2023 13 8 - 23 mg/dL Final     Creatinine   Date Value Ref Range Status   10/30/2023 0.69 (L) 0.76 - 1.27 mg/dL Final     Glucose   Date Value Ref Range Status   10/30/2023 96 65 - 99 mg/dL Final     Calcium   Date Value Ref Range Status   10/30/2023 9.3 8.6 - 10.5 mg/dL Final                Imaging Results (All)       Procedure Component Value Units Date/Time    CT Pelvis Without Contrast [[id number]] Collected: 10/30/23 1700     Updated: 10/30/23 1705    Narrative:      CT PELVIS WO CONTRAST-     INDICATIONS: Sacral decubitus ulcer. Radiation dose  reduction techniques  were utilized, including automated exposure control and exposure  modulation based on body size.     TECHNIQUE: NONCONTRAST PELVIS CT     COMPARISON: None available     FINDINGS:     Sacral decubitus ulcer is apparent, subcutaneous phlegmonous change, and  small foci of soft tissue gas that may be evidence of developing  abscess, for example axial image 77, follow-up advised. Erosion of the  coccyx and distal sacrum and posterior left ischium is noted, compatible  with osteomyelitis. No acute fractures are noted.     Urinary bladder is catheterized. Gas in the urinary bladder may be from  catheterization or may be evidence of urinary infection. Likewise,  bladder wall thickening may be from lack of distention and/or infection.     Scattered arterial calcifications are present.          Impression:         As described.           This report was finalized on 10/30/2023 5:02 PM by Dr. Chkii Pepper M.D on Workstation: [id number]       CT Head Without Contrast [[id number]] Collected: 10/30/23 1652     Updated: 10/30/23 1652    Narrative:      CT HEAD WITHOUT CONTRAST     HISTORY:  Confusion.     COMPARISON: None.     FINDINGS: The brain and ventricles are symmetrical. There is no evidence  of hemorrhage, hydrocephalus or of abnormal extra-axial fluid. No focal  area of decreased attenuation to suggest acute infarction is identified.       Impression:      No acute process is identified. Further evaluation could be  performed with MRI examination of the brain as indicated.           Radiation dose reduction techniques were utilized, including automated  exposure control and exposure modulation based on body size.          XR Chest 1 View [[id number]] Collected: 10/30/23 1614     Updated: 10/30/23 1618    Narrative:      XR CHEST 1 VW-10/30/2023     HISTORY: Altered mental status.     Heart size is at the upper limits of normal. There is elevation of the  left hemidiaphragm. Lungs appear clear.  Cervical fusion plate is seen.  Bones and soft tissues are otherwise unremarkable.       Impression:      1. Borderline cardiomegaly.  2. Elevated left hemidiaphragm.        This report was finalized on 10/30/2023 4:15 PM by Dr. Willie Sosa M.D on Workstation: [id number]                 Assessment:  Active Hospital Problems    Diagnosis  POA    **Altered mental status [R41.82]  Yes      Resolved Hospital Problems   No resolved problems to display.   Anemia  Decubitus ulcers  Osteomyelitis sacrum      Plan:  Continue antibiotics  Wound nurse to see  May need surgical debridement  Will await wound nurse eval  Trend labs  Fluids  Monitor on telemetry    Antionette Rodriguez MD  10/30/2023  20:06 EDT

## 2023-10-31 NOTE — PROGRESS NOTES
"[facility] Clinical Pharmacy Services: Vancomycin Pharmacokinetic Initial Consult Note    Misael Sharp is a 68 y.o. male who is on day 1 of pharmacy to dose vancomycin.    Indication: Sepsis and Skin and Soft Tissue  Consulting Provider: Dr. Rodriguez  Planned Duration of Therapy: 5 days  Loading Dose Ordered or Given: 1500 mg on 10/30 at 1749  MRSA PCR performed: no  Culture/Source:   10/30 WoundCx x4 - pending  10/30 UrineCx - pending  10/30 BloodCx x2 - pending  Target: -600 mg/L.hr   Pertinent Vanc Dosing History: n/a  Other Antimicrobials: Zosyn    Vitals/Labs  Ht: 172.7 cm (68\"); Wt: 79.3 kg (174 lb 12.8 oz)  Temp Readings from Last 1 Encounters:   10/30/23 97.5 °F (36.4 °C) (Oral)    Estimated Creatinine Clearance: 116.5 mL/min (A) (by C-G formula based on SCr of 0.69 mg/dL (L)).        Results from last 7 days   Lab Units 10/30/23  1520   CREATININE mg/dL 0.69*   WBC 10*3/mm3 10.05     Assessment/Plan:    Vancomycin Dose:   1000 mg IV every  12  hours  Predictive AUC level for the dose ordered is 452 mg/L.hr, which is within the target of 400-600 mg/L.hr  Vanc Trough has been ordered for 10/31 at 1700     Pharmacy will follow patient's kidney function and will adjust doses and obtain levels as necessary. Thank you for involving pharmacy in this patient's care. Please contact pharmacy with any questions or concerns.                           Mercy Palma, [facility]  Clinical Pharmacist    "

## 2023-10-31 NOTE — PROGRESS NOTES
Discharge Planning Assessment  [facility]     Patient Name: Misael Sharp  MRN: [id number]  Today's Date: 10/31/2023    Admit Date: 10/30/2023    Plan: Return to [facility]    Discharge Needs Assessment       Row Name 10/31/23 1032       Living Environment    People in Home facility resident    Current Living Arrangements extended care facility    Family Caregiver if Needed none    Quality of Family Relationships unable to assess       Transition Planning    Patient/Family Anticipates Transition to long-term care facility    Patient/Family Anticipated Services at Transition skilled nursing    Transportation Anticipated health plan transportation;agency       Discharge Needs Assessment    Current Outpatient/Agency/Support Group skilled nursing facility    Outpatient/Agency/Support Group Needs skilled nursing facility    Discharge Facility/Level of Care Needs nursing facility, skilled;nursing facility, intermediate    Discharge Coordination/Progress SNF                   Discharge Plan       Row Name 10/31/23 1047       Plan    Plan Return to [facility]    Patient/Family in Agreement with Plan unable to assess    Plan Comments Pt admitted from [facility] where, per his [facility] Guardian (Jayesh Sams, [phone], scanned paperwork to [employer]), he is from LTC and will return at d/c. Pharmacy updated and packet in CCP office. Per Dorcas, pt is from an IC level bed with bed hold and can return at d/c. Guardian does not want a copy of pt's Medicare Rights notice, and is agreeable to a VM message informing of pt's dc if she cannot be reached directly. Mago Fontanez LCSW                  Continued Care and Services - Admitted Since 10/30/2023       Destination       Service Provider Request Status Selected Services Address Phone Fax Patient Preferred    [facility] Accepted N/A [address] [id number] [phone] --                      Demographic Summary       Row Name 10/31/23 1031       General Information    Admission Type observation    Arrived From [facility]    Required Notices Provided Observation Status Notice    Referral Source admission list    Reason for Consult discharge planning    Preferred Language English                   Functional Status       Row Name 10/31/23 1032       Functional Status    Usual Activity Tolerance poor    Current Activity Tolerance poor       Functional Status, IADL    Medications completely dependent    Meal Preparation completely dependent    Housekeeping completely dependent    Laundry completely dependent    Shopping completely dependent       Mental Status Summary    Recent Changes in Mental Status/Cognitive Functioning unable to assess                   Psychosocial    No documentation.                  Abuse/Neglect    No documentation.                  Legal    No documentation.                  Substance Abuse    No documentation.                  Patient Forms    No documentation.                     Emerald Fontanez LCSW

## 2023-10-31 NOTE — PROGRESS NOTES
Name: Misael Sharp ADMIT: 10/30/2023   : 1955  PCP: Linus Martinez MD    MRN: [id number] LOS: 0 days   AGE/SEX: 68 y.o. male  ROOM: [facility]     Subjective   Subjective   Resting in bed, he is alert and oriented. Per RN patient having semi soft/loose stool. Patient has no complaints at this time.        Objective   Objective   Vital Signs  Temp:  [97.5 °F (36.4 °C)-98.6 °F (37 °C)] 98.6 °F (37 °C)  Heart Rate:  [67-81] 75  Resp:  [16-18] 18  BP: ()/(54-73) 98/56  SpO2:  [96 %-100 %] 98 %  on   ;   Device (Oxygen Therapy): room air  Body mass index is 26.58 kg/m².  Physical Exam  Constitutional:       General: He is not in acute distress.     Appearance: He is ill-appearing. He is not toxic-appearing.   Cardiovascular:      Rate and Rhythm: Normal rate and regular rhythm.   Pulmonary:      Effort: Pulmonary effort is normal. No respiratory distress.      Breath sounds: Normal breath sounds. No wheezing.   Abdominal:      Palpations: Abdomen is soft.      Tenderness: There is no abdominal tenderness.   Musculoskeletal:         General: No tenderness.      Right lower leg: No edema.      Left lower leg: No edema.      Comments: B/l LE atrophied   Skin:     General: Skin is warm and dry.   Neurological:      General: No focal deficit present.      Mental Status: He is alert and oriented to person, place, and time. Mental status is at baseline.      Motor: No weakness.         Results Review     I reviewed the patient's new clinical results.  Results from last 7 days   Lab Units 10/31/23  0536 10/30/23  1520   WBC 10*3/mm3 11.38* 10.05   HEMOGLOBIN g/dL 9.2* 10.3*   PLATELETS 10*3/mm3 316 300     Results from last 7 days   Lab Units 10/31/23  0536 10/30/23  1520   SODIUM mmol/L 142 141   POTASSIUM mmol/L 3.7 4.2   CHLORIDE mmol/L 107 104   CO2 mmol/L 25.0 28.3   BUN mg/dL 10 13   CREATININE mg/dL 0.64* 0.69*   GLUCOSE mg/dL 102* 96   Estimated Creatinine Clearance: 125.6 mL/min (A) (by C-G formula  "based on SCr of 0.64 mg/dL (L)).  Results from last 7 days   Lab Units 10/30/23  1520   ALBUMIN g/dL 2.9*   BILIRUBIN mg/dL 0.4   ALK PHOS U/L 126*   AST (SGOT) U/L 28   ALT (SGPT) U/L 18     Results from last 7 days   Lab Units 10/31/23  0536 10/30/23  1520   CALCIUM mg/dL 8.8 9.3   ALBUMIN g/dL  --  2.9*     Results from last 7 days   Lab Units 10/30/23  1520   LACTATE mmol/L 1.0   No results found for: \"COVID19\"  Glucose   Date/Time Value Ref Range Status   10/31/2023 0641 105 70 - 130 mg/dL Final   10/31/2023 0011 116 70 - 130 mg/dL Final       CT Head Without Contrast  Narrative: CT HEAD WITHOUT CONTRAST     HISTORY:  Confusion.     COMPARISON: None.     FINDINGS: The brain and ventricles are symmetrical. There is no evidence  of hemorrhage, hydrocephalus or of abnormal extra-axial fluid. No focal  area of decreased attenuation to suggest acute infarction is identified.     Impression: No acute process is identified. Further evaluation could be  performed with MRI examination of the brain as indicated.           Radiation dose reduction techniques were utilized, including automated  exposure control and exposure modulation based on body size.        This report was finalized on 10/30/2023 8:34 PM by Dr. Hai Rees M.D on Workstation: [id number]     CT Pelvis Without Contrast  Narrative: CT PELVIS WO CONTRAST-     INDICATIONS: Sacral decubitus ulcer. Radiation dose reduction techniques  were utilized, including automated exposure control and exposure  modulation based on body size.     TECHNIQUE: NONCONTRAST PELVIS CT     COMPARISON: None available     FINDINGS:     Sacral decubitus ulcer is apparent, subcutaneous phlegmonous change, and  small foci of soft tissue gas that may be evidence of developing  abscess, for example axial image 77, follow-up advised. Erosion of the  coccyx and distal sacrum and posterior left ischium is noted, compatible  with osteomyelitis. No acute fractures are noted.     Urinary " bladder is catheterized. Gas in the urinary bladder may be from  catheterization or may be evidence of urinary infection. Likewise,  bladder wall thickening may be from lack of distention and/or infection.     Scattered arterial calcifications are present.        Impression:    As described.           This report was finalized on 10/30/2023 5:02 PM by Dr. Chiki Pepper M.D on Workstation: [id number]     XR Chest 1 View  Narrative: XR CHEST 1 VW-10/30/2023     HISTORY: Altered mental status.     Heart size is at the upper limits of normal. There is elevation of the  left hemidiaphragm. Lungs appear clear. Cervical fusion plate is seen.  Bones and soft tissues are otherwise unremarkable.     Impression: 1. Borderline cardiomegaly.  2. Elevated left hemidiaphragm.        This report was finalized on 10/30/2023 4:15 PM by Dr. Willie Sosa M.D on Workstation: [id number]       Scheduled Medications  piperacillin-tazobactam, 3.375 g, Intravenous, Q8H  sodium hypochlorite, , Topical, Q12H  vancomycin, 1,000 mg, Intravenous, Q12H    Infusions  Pharmacy to dose vancomycin,   sodium chloride, 75 mL/hr, Last Rate: 75 mL/hr (10/31/23 1018)    Diet  Diet: Cardiac Diets; Healthy Heart (2-3 Na+); Texture: Regular Texture (IDDSI 7); Fluid Consistency: Thin (IDDSI 0)         I have personally reviewed:  []  Laboratory   []  Microbiology   []  Radiology   []  EKG/Telemetry   []  Cardiology/Vascular   []  Pathology   []  Records    Assessment/Plan     Active Hospital Problems    Diagnosis  POA    **Altered mental status [R41.82]  Yes      Resolved Hospital Problems   No resolved problems to display.       68 y.o. male admitted with Altered mental status.    Altered mental status  - AMS from yesterday appears to be almost entirely resolved, he is able to tell me he is in [facility], that today is Halloween, he initially states the year as 2020 but corrects himself  - AMS could be multifactorial from sacral osteo vs  polypharmacy vs other    Decubitus ulcers  Sacral osteomyelitis  - Wound care RN has evaluated and rec general surgery consult to evaluate for possible surgical intervention of wounds  - for findings of sacral osteomyelitis will ask ID to see as well, continue broad spectrum abx in the mean time    Anemia, normocytic  - Hgb 9.2, MCV 86  - no historical labs  - check iron profile, b12, folate    Elevated troponin  - stable/flat at 38 x2  - EKG without ischemic changes    Lovenox 40 mg SC daily for DVT prophylaxis.  Full code.  Discussed with patient and nursing staff.  Anticipate discharge to SNU facility timing yet to be determined.      Eboni Martin MD  [facility]  10/31/23  17:04 EDT    I wore protective equipment throughout this patient encounter including gloves.  Hand hygiene was performed before donning protective equipment and after removal when leaving the room.    Expected Discharge Date and Time       Expected Discharge Date Expected Discharge Time    Nov 3, 2023              Copied text in this note has been reviewed and is accurate as of 10/31/23.

## 2023-10-31 NOTE — PROGRESS NOTES
"[facility] Clinical Pharmacy Services: Vancomycin Monitoring Note    Misael Sharp is a 68 y.o. male who is on day 1 of pharmacy to dose vancomycin for Skin and Soft Tissue infection    Current Vancomycin Dose:   1000 mg IV every  12  hours  Updated Cultures and Sensitivities:   10/30 wound cx gram negative prelim  10/30 bld cx pending  10/30 urine cx pending      Vitals/Labs  Ht: 172.7 cm (68\"); Wt: 79.3 kg (174 lb 12.8 oz)   Temp Readings from Last 1 Encounters:   10/31/23 97.7 °F (36.5 °C) (Oral)     Estimated Creatinine Clearance: 125.6 mL/min (A) (by C-G formula based on SCr of 0.64 mg/dL (L)).     Results from last 7 days   Lab Units 10/31/23  0536 10/30/23  1520   CREATININE mg/dL 0.64* 0.69*   WBC 10*3/mm3 11.38* 10.05     Assessment/Plan  Creatinine stable today.   Vancomycin Dose: continue 1000 mg IV every  12  hours; provides a predicted  mg/L.hr   Next Level Date and Time: Vanc Trough changed to be drawn prior to AM dose tomorrow -- likely can be drawn along with AM labs to avoid extra lab collection.   We will continue to monitor patient changes and renal function     Thank you for involving pharmacy in this patient's care. Please contact pharmacy with any questions or concerns.       Edel Daly, PharmD  Clinical Pharmacist          "

## 2023-10-31 NOTE — PROGRESS NOTES
Clinical Pharmacy Services: Medication History    Misael Sharp is a 68 y.o. male presenting to [facility] for Altered mental status [R41.82]  Altered mental status, unspecified altered mental status type [R41.82]  Urinary tract infection associated with indwelling urethral catheter, initial encounter [T83.511A, N39.0]  Pressure injury of skin, unspecified injury stage, unspecified location [L89.90]    He  has no past medical history on file.    Allergies as of 10/30/2023    (No Known Allergies)       Medication information was obtained from: list provided by [facility] and rehab    Prior to Admission Medications       Prescriptions Last Dose Informant Patient Reported? Taking?    acetaminophen (TYLENOL) 325 MG tablet   Yes Yes    Take 2 tablets by mouth Every 8 (Eight) Hours As Needed for Mild Pain or Fever.    apixaban (ELIQUIS) 2.5 MG tablet tablet 10/30/2023 custodial Yes Yes    Take 1 tablet by mouth 2 (Two) Times a Day.    ascorbic acid (VITAMIN C) 500 MG tablet 10/30/2023 custodial Yes Yes    Take 2 tablets by mouth Daily.    atorvastatin (LIPITOR) 10 MG tablet 10/29/2023 Nursing Home Yes Yes    Take 1 tablet by mouth Every Night.    baclofen (LIORESAL) 20 MG tablet 10/30/2023 custodial Yes Yes    Take 1 tablet by mouth 3 (Three) Times a Day.    Cholecalciferol 25 MCG (1000 UT) tablet 10/30/2023 custodial Yes Yes    Take 2 tablets by mouth Daily.    collagenase 250 UNIT/GM ointment 10/30/2023 Nursing Home Yes Yes    Apply 1 application  topically to the appropriate area as directed Daily. To R heel and R buttocks    docusate sodium (COLACE) 100 MG capsule 10/30/2023 custodial Yes Yes    Take 1 capsule by mouth Daily.    ferrous gluconate (FERGON) 240 (27 FE) MG tablet 10/30/2023  Yes Yes    Take 1 tablet by mouth Daily With Breakfast.    folic acid (FOLVITE) 1 MG tablet 10/30/2023 custodial Yes Yes    Take 2 tablets by mouth Daily.    furosemide (LASIX) 20 MG  tablet 10/30/2023 Other, Nursing Home Yes Yes    Take 1 tablet by mouth Daily.    gabapentin (NEURONTIN) 100 MG capsule 10/30/2023 [facility] Yes Yes    Take 3 capsules by mouth 3 (Three) Times a Day.    HYDROcodone-acetaminophen (NORCO) 5-325 MG per tablet 10/28/2023 [facility] Yes Yes    Take 1 tablet by mouth Every 4 (Four) Hours As Needed for Moderate Pain.    hydrocortisone 1 % lotion 10/30/2023 [facility] Yes Yes    Apply 1 application  topically to the appropriate area as directed 3 (Three) Times a Day. To R chest    ipratropium-albuterol (COMBIVENT RESPIMAT)  MCG/ACT inhaler   Yes Yes    Inhale 1 puff 4 (Four) Times a Day As Needed for Wheezing or Shortness of Air.    multivitamin with minerals (MULTIVITAMIN ADULT PO) 10/30/2023 [facility] Yes Yes    Take 1 tablet by mouth Daily.    potassium chloride (MICRO-K) 10 MEQ CR capsule 10/30/2023 [facility] Yes Yes    Take 1 capsule by mouth Daily.    senna 8.6 MG tablet 10/30/2023 [facility] Yes Yes    Take 2 tablets by mouth 2 (Two) Times a Day.    sertraline (ZOLOFT) 50 MG tablet 10/30/2023 [facility] Yes Yes    Take 1 tablet by mouth Daily.    sodium hypochlorite 0.25 % solution topical solution 10/30/2023 [facility] Yes Yes    Apply 1 application  topically to the appropriate area as directed Every 12 (Twelve) Hours. To R heel    thiamine (VITAMIN B-1) 100 MG tablet  tablet 10/30/2023 [facility] Yes Yes    Take 1 tablet by mouth Daily.    tiZANidine (ZANAFLEX) 2 MG tablet 10/23/2023 [facility] Yes Yes    Take 1 tablet by mouth Every 8 (Eight) Hours As Needed for Muscle Spasms.    Zinc Sulfate 220 (50 Zn) MG tablet 10/30/2023 [facility] Yes Yes    Take 1 tablet by mouth Daily.    bisacodyl (DULCOLAX) 10 MG suppository More than a month  Yes No    Insert 1 suppository into the rectum Daily As Needed for Constipation.    polyethylene glycol (MIRALAX) 17 g packet More than a month Nursing Home Yes No    Take 17 g by mouth Daily As  Needed (constipation).          Medication notes:     This medication list is complete to the best of my knowledge as of 10/31/2023    Please call if questions.    Edel Daly, [place]ise  10/31/2023 12:18 EDT

## 2023-10-31 NOTE — NURSING NOTE
10/31/23 1040   Wound 10/30/23 1527 medial coccyx Pressure Injury   Placement Date/Time: 10/30/23 1527   Present on Original Admission: Yes  Orientation: medial  Location: coccyx  Primary Wound Type: Pressure Injury   Pressure Injury Stage 4   Dressing Appearance moist drainage;dressing loose   Base moist;red  (difficult to assess due to small opening and undermining)   Periwound Temperature warm   Periwound Skin Turgor soft   Wound Length (cm) 2 cm   Wound Width (cm) 2 cm   Wound Depth (cm) 2 cm   Wound Surface Area (cm^2) 4 cm^2   Wound Volume (cm^3) 8 cm^3   Undermining [Depth (cm)/Location] 4-6 cm / 9-3 oclock   Drainage Characteristics/Odor tan   Drainage Amount small   Care, Wound cleansed with;sterile normal saline   Dressing Care dressing changed  (packed wound with NS moist 4x4 gauze, cover with island white bordered gauze)   Wound 10/30/23 1534 Right posterior heel   Placement Date/Time: 10/30/23 1534   Present on Original Admission: Yes  Side: Right  Orientation: posterior  Location: heel   Pressure Injury Stage 3   Dressing Appearance intact  (optifoam)   Base moist;pink;white   Periwound intact;dry;pink  (dry peeling skin)   Wound Length (cm) 4 cm   Wound Width (cm) 5 cm   Wound Depth (cm) 0.1 cm   Wound Surface Area (cm^2) 20 cm^2   Wound Volume (cm^3) 2 cm^3   Care, Wound cleansed with;sterile normal saline   Dressing Care dressing changed  (opticel AG, 4x4 optifoam)   Wound 10/31/23 Left posterior ischial tuberosity Pressure Injury   Placement Date: 10/31/23   Present on Original Admission: Yes  Side: Left  Orientation: posterior  Location: ischial tuberosity  Primary Wound Type: Pressure Injury   Pressure Injury Stage 4   Base pink;moist   Wound Length (cm) 5 cm   Wound Width (cm) 1.5 cm   Wound Depth (cm) 0.2 cm   Wound Surface Area (cm^2) 7.5 cm^2   Wound Volume (cm^3) 1.5 cm^3   Drainage Amount none   Care, Wound cleansed with;sterile normal saline   Dressing Care dressing changed  (NS moist  gauze, cover with optifoam)   Wound 10/31/23 penis Other (comment)   Placement Date: 10/31/23   Present on Original Admission: Yes  Location: penis  Primary Wound Type: (c) Other (comment)   Base   (full thickness erosion to glanz extending down shaft of penis)   Skin Interventions   Pressure Reduction Devices   (low air loss mattress ordered, needs heel boots applied)   Pressure Reduction Techniques heels elevated off bed  (pillows)   Positioning   Body Position turned;right  (pillow)     Wound/ostomy - consult received for new admit with wounds present on admission. Chart reviewed and patient has no previous encounters in the care everywhere or at within the [facility], patient admitted from [facility] facility. Per the face sheet he was admitted to that Facility June 2023.     Patient awake in bed, conversant, difficult to understand at times due to slight mumbled speech. Through discussion with patient I am told he had a C 2-3 injury 7 years ago from a fall at an extended stay hotel he was living at with his wife. He is not sure what hospital he was treated at but he went to a rehab in [place]. He lived at home in [place] with his wife for 2 years and then moved to [facility] where he lived for 5 years, he ended up at [facility] when [facility] closed, he denies having wounds while at [facility], he would be up in wheelchair there and had an air mattress at that facility. When transferred to [facility] he did not initially have an air mattress and the wound occurred, he now has an air mattress, he has his same wheelchair at [facility] so it sounds like his support surfaces there are now satisfactory.     Patient has wounds to coccyx, L ischial, small 1x1 cm full thickness wound to R sacral adjacent to coccyx wound and R heel, all pressure related. Urethral erosion form chronic indwelling catheter noted, there is a male incontinence wrap around catheter which leads me to  believe he may have urine leakage but none currently.     Coccyx wound small opening with undermining superior to wound, difficult to see entire wound due to the undermining and small opening but what is visible is red/moist, tan, purulent drainage to dressing, CT scan suggestive of  osteomyelitis/abscess. Recommend dakins moist dressing changes, 1 4x4 fluffed gauze fits in the wound, recommend to cover with bordered foam or white bordered gauze to minimize stool contamination.     L ischial wound no drainage, clean wound bed, will treat with dakins as well  due to location and risk for stool contamination.     R heel with full thickness wound, moist, small amount of white slough vs bioburden, no drainage. Will apply opticel AG and cover with optifoam. May need to add honey to debride wound bed a bit.     Patient needs a low air loss mattress for pressure redistribution, heel boots for adequate offloading of the heels. Colostomy for stool diversion should be considered for healing of stage 4 sacrococcygeal and ischial wounds. Based on CT results patient patient should be evaluated by general surgeon for need of surgical debridement.      I spoke to APRN with Dr. Martinez who followed patient at [facility]. [facility], has a history of osteomyelitis to sacrum dating back to 2018, .with wound healing and recurrence off and on. The coccyx wound was closed/resurfaced and L ischial was open when patient left [facility] in June 2023.

## 2023-10-31 NOTE — PROGRESS NOTES
"Nutrition Services    Patient Name:  Misael Sharp  YOB: 1955  MRN: [id number]  Admit Date:  10/30/2023    Assessment Date:  10/31/23    Summary: Nutrition assessment initiated due to skin issues.  Pt was admitted with AMS, Anemia, decubitus ulcers, Osteomyelitis sacrum. Pt confused, needs to be feds, ate 50% of his bfast today. Labs, meds, photos of wounds reviewed.  Large BM today. Concerns for wound contamination noted.     Plan/Recommendations:  Boost plus and Leo with bid to support wound healing  Encourage po.    Will follow clinical course, nutrition needs.    CLINICAL NUTRITION ASSESSMENT      Reason for Assessment Pressure Injury and/or Non-Healing Wound     Diagnosis/Problem   AMS, Anemia, decubitus ulcers, Osteomyelitis sacrum    Medical/Surgical History History reviewed. No pertinent past medical history.    History reviewed. No pertinent surgical history.     Anthropometrics        Current Height  Current Weight  BMI kg/m2 Height: 172.7 cm (68\")  Weight: 79.3 kg (174 lb 12.8 oz) (10/30/23 1551)  Body mass index is 26.58 kg/m².   Adjusted BMI (if applicable)    BMI Category Overweight (25 - 29.9)   Ideal Body Weight (IBW) 68.4kg   Usual Body Weight (UBW) unknown   Weight Trend Unknown   Weight History Wt Readings from Last 30 Encounters:   10/30/23 1551 79.3 kg (174 lb 12.8 oz)        Estimated/Assessed Needs        Current Weight  Weight: 79.3 kg (174 lb 12.8 oz) (10/30/23 1551)       Energy Requirements    Weight for Calculation 79 kg   Method for Estimation  25 kcal/kg   EST Needs (kcal/day) 1975       Protein Requirements    Weight for Calculation 79 kg   EST Protein Needs (g/kg) 1.2 gm/kg   EST Daily Needs (g/day) 95       Fluid Requirements     Method for Estimation 1 mL/kcal    EST Needs (mL/day)      Labs       Pertinent Labs    Results from last 7 days   Lab Units 10/31/23  0536 10/30/23  1520   SODIUM mmol/L 142 141   POTASSIUM mmol/L 3.7 4.2   CHLORIDE mmol/L 107 104   CO2 " "mmol/L 25.0 28.3   BUN mg/dL 10 13   CREATININE mg/dL 0.64* 0.69*   CALCIUM mg/dL 8.8 9.3   BILIRUBIN mg/dL  --  0.4   ALK PHOS U/L  --  126*   ALT (SGPT) U/L  --  18   AST (SGOT) U/L  --  28   GLUCOSE mg/dL 102* 96     Results from last 7 days   Lab Units 10/31/23  0536 10/30/23  1520   HEMOGLOBIN g/dL 9.2* 10.3*   HEMATOCRIT % 28.5* 32.5*   WBC 10*3/mm3 11.38* 10.05   ALBUMIN g/dL  --  2.9*     Results from last 7 days   Lab Units 10/31/23  0536 10/30/23  1520   PLATELETS 10*3/mm3 316 300     No results found for: \"COVID19\"  No results found for: \"HGBA1C\"       Medications           Scheduled Medications piperacillin-tazobactam, 3.375 g, Intravenous, Q8H  vancomycin, 1,000 mg, Intravenous, Q12H       Infusions Pharmacy to dose vancomycin,   sodium chloride, 75 mL/hr, Last Rate: 75 mL/hr (10/31/23 1018)       PRN Medications   acetaminophen    [DISCONTINUED] senna-docusate sodium **AND** polyethylene glycol **AND** bisacodyl **AND** bisacodyl    melatonin    ondansetron **OR** ondansetron    Pharmacy to dose vancomycin    [COMPLETED] Insert Peripheral IV **AND** sodium chloride     Physical Findings          General Findings alert, disoriented   Oral/Mouth Cavity tooth or teeth missing   Edema  not assessed   Gastrointestinal last bowel movement: 10/31   Skin  pressure injury: Coccyx,gluteal, heel   Tubes/Drains/Lines none   NFPE No clinical signs of muscle wasting or fat loss   --  Current Nutrition Orders & Evaluation of Intake       Oral Nutrition     Food Allergies NKFA   Current PO Diet Diet: Cardiac Diets; Healthy Heart (2-3 Na+); Texture: Regular Texture (IDDSI 7); Fluid Consistency: Thin (IDDSI 0)   Supplement n/a   PO Evaluation     % PO Intake 50% bfast    Factors Affecting Intake: altered mental status, decreased appetite, unable to feed self   --  PES STATEMENT / NUTRITION DIAGNOSIS      Nutrition Dx Problem  Problem: Increased Nutrient Needs  Etiology: Medical Diagnosis - multiple pressure injuries  "   Signs/Symptoms: Report/Observation     NUTRITION INTERVENTION / PLAN OF CARE      Intervention Goal(s) Maintain nutrition status, Meet estimated needs, Disease management/therapy, Initiate feeding/diet, Tolerate PO , Increase intake, and No significant weight loss         RD Intervention/Action Continue to monitor, Care plan reviewed, and Recommend/order: ONS   --      Prescription/Orders:       PO Diet       Supplements Boost and Leo bid      Enteral Nutrition       Parenteral Nutrition    New Prescription Ordered? Yes   --      Monitor/Evaluation Per protocol, I&O, PO intake, Supplement intake, Weight, Skin status, GI status, Symptoms, POC/GOC, Swallow function, Hemodynamic stability   Discharge Plan/Needs Pending clinical course   --    RD to follow per protocol.      Electronically signed by:  Ann Lawson RD  10/31/23 12:57 EDT

## 2023-10-31 NOTE — DISCHARGE PLACEMENT REQUEST
"Erika Sharp (68 y.o. Male)       Date of Birth   1955    Social Security Number       Address   [facility] [address] [address]    Home Phone   [phone]    MRN   [id number]       Congregational   Unknown    Marital Status   Unknown                            Admission Date   10/30/23    Admission Type   Emergency    Admitting Provider   Antionette Rodriguez MD    Attending Provider   Eboni Martin MD    Department, Room/Bed   [address], P585/1       Discharge Date       Discharge Disposition       Discharge Destination                                 Attending Provider: Eboni Martin MD    Allergies: No Known Allergies    Isolation: Contact   Infection: Candida Auris (rule out) (10/31/23)   Code Status: CPR    Ht: 172.7 cm (68\")   Wt: 79.3 kg (174 lb 12.8 oz)    Admission Cmt: None   Principal Problem: Altered mental status [R41.82]                   Active Insurance as of 10/30/2023       Primary Coverage       Payor Plan Insurance Group Employer/Plan Group    MEDICARE MEDICARE A & B        Payor Plan Address Payor Plan Phone Number Payor Plan Fax Number Effective Dates    [address] [phone]  2/1/2019 - None Entered    [place] [address]         Subscriber Name Subscriber Birth Date Member ID       ERIKA SHARP 1955 [id number]                     Emergency Contacts        (Rel.) Home Phone Work Phone Mobile Phone    Weston Sams (Legal Guardian) [phone] -- --              "

## 2023-10-31 NOTE — SIGNIFICANT NOTE
10/31/23 1017   OTHER   Discipline physical therapist   Rehab Time/Intention   Session Not Performed other (see comments)  (PT spoke with pt - from LT and [facility] staff uses a keke lift to get him to his electric WC. Pt is not appropriate for acute skilled PT, will sign-off.)   Therapy Assessment/Plan (PT)   Criteria for Skilled Interventions Met (PT) no;does not meet criteria for skilled intervention

## 2023-10-31 NOTE — PLAN OF CARE
Goal Outcome Evaluation:       Problem: Adjustment to Illness (Sepsis/Septic Shock)  Goal: Optimal Coping  Outcome: Ongoing, Progressing  Intervention: Optimize Psychosocial Adjustment to Illness  Recent Flowsheet Documentation  Taken 10/30/2023 2002 by Curtis Chau RN  Family/Support System Care: ([place] of [place] state guardian) --     Problem: Bleeding (Sepsis/Septic Shock)  Goal: Absence of Bleeding  Outcome: Ongoing, Progressing     Problem: Glycemic Control Impaired (Sepsis/Septic Shock)  Goal: Blood Glucose Level Within Desired Range  Outcome: Ongoing, Progressing     Problem: Infection Progression (Sepsis/Septic Shock)  Goal: Absence of Infection Signs and Symptoms  Outcome: Ongoing, Progressing  Intervention: Initiate Sepsis Management  Recent Flowsheet Documentation  Taken 10/30/2023 2002 by Curtis Chau RN  Infection Prevention: single patient room provided  Intervention: Promote Recovery  Recent Flowsheet Documentation  Taken 10/30/2023 2002 by Curtis Chau RN  Activity Management: bedrest  Intervention: Promote Stabilization  Recent Flowsheet Documentation  Taken 10/30/2023 2002 by Curtis Chau RN  Fluid/Electrolyte Management: (Maintenance IV fluids) other (see comments)     Problem: Nutrition Impaired (Sepsis/Septic Shock)  Goal: Optimal Nutrition Intake  Outcome: Ongoing, Progressing     Problem: Skin Injury Risk Increased  Goal: Skin Health and Integrity  Outcome: Ongoing, Progressing  Intervention: Optimize Skin Protection  Recent Flowsheet Documentation  Taken 10/31/2023 0400 by Curtis Chau RN  Head of Bed (HOB) Positioning: HOB elevated  Taken 10/31/2023 0200 by Curtis Chau RN  Head of Bed (HOB) Positioning: HOB elevated  Taken 10/31/2023 0000 by Curtis Chau RN  Head of Bed (HOB) Positioning: HOB elevated  Taken 10/30/2023 2200 by Curtis Chau RN  Head of Bed (HOB) Positioning: HOB elevated  Taken 10/30/2023 2002 by Curtis Chau RN  Head of Bed (HOB) Positioning: HOB  elevated     Problem: Adult Inpatient Plan of Care  Goal: Plan of Care Review  Outcome: Ongoing, Progressing  Goal: Patient-Specific Goal (Individualized)  Outcome: Ongoing, Progressing  Goal: Absence of Hospital-Acquired Illness or Injury  Outcome: Ongoing, Progressing  Intervention: Identify and Manage Fall Risk  Recent Flowsheet Documentation  Taken 10/31/2023 0400 by Curtis Chau RN  Safety Promotion/Fall Prevention:   activity supervised   fall prevention program maintained   safety round/check completed  Taken 10/31/2023 0200 by Curtis Chau RN  Safety Promotion/Fall Prevention:   activity supervised   fall prevention program maintained   safety round/check completed  Taken 10/31/2023 0000 by Curtis Chau RN  Safety Promotion/Fall Prevention:   activity supervised   fall prevention program maintained   safety round/check completed  Taken 10/30/2023 2200 by Curtis Chau RN  Safety Promotion/Fall Prevention:   activity supervised   fall prevention program maintained   safety round/check completed  Taken 10/30/2023 2002 by Curtis Chau RN  Safety Promotion/Fall Prevention:   activity supervised   fall prevention program maintained   safety round/check completed  Intervention: Prevent Skin Injury  Recent Flowsheet Documentation  Taken 10/31/2023 0400 by Curtis Chau RN  Body Position:   left   tilted  Taken 10/31/2023 0200 by Curtis Chau RN  Body Position:   right   tilted  Taken 10/31/2023 0000 by Curtis Chau RN  Body Position:   right   tilted  Taken 10/30/2023 2200 by Curtis Chau RN  Body Position:   turned   left  Taken 10/30/2023 2002 by Curtis Chau RN  Body Position: supine  Intervention: Prevent and Manage VTE (Venous Thromboembolism) Risk  Recent Flowsheet Documentation  Taken 10/30/2023 2002 by Curtis Chau RN  Activity Management: bedrest  VTE Prevention/Management:   sequential compression devices on   bilateral  Intervention: Prevent Infection  Recent Flowsheet  Documentation  Taken 10/30/2023 2002 by Curtis Chau RN  Infection Prevention: single patient room provided  Goal: Optimal Comfort and Wellbeing  Outcome: Ongoing, Progressing  Goal: Readiness for Transition of Care  Outcome: Ongoing, Progressing  Intervention: Mutually Develop Transition Plan  Recent Flowsheet Documentation  Taken 10/30/2023 2150 by Curtis Chau RN  Equipment Currently Used at Home: none  Taken 10/30/2023 2148 by Curtis Chau RN  Transportation Anticipated: health plan transportation  Patient/Family Anticipated Services at Transition: skilled nursing  Patient/Family Anticipates Transition to: inpatient rehabilitation facility     Problem: Fall Injury Risk  Goal: Absence of Fall and Fall-Related Injury  Outcome: Ongoing, Progressing  Intervention: Identify and Manage Contributors  Recent Flowsheet Documentation  Taken 10/31/2023 0000 by Curtis Chau RN  Medication Review/Management: medications reviewed  Taken 10/30/2023 2200 by Curtis Chau RN  Medication Review/Management: medications reviewed  Taken 10/30/2023 2002 by Curtis Chau RN  Medication Review/Management: medications reviewed  Intervention: Promote Injury-Free Environment  Recent Flowsheet Documentation  Taken 10/31/2023 0400 by Curtis Chau RN  Safety Promotion/Fall Prevention:   activity supervised   fall prevention program maintained   safety round/check completed  Taken 10/31/2023 0200 by Curtis Chau RN  Safety Promotion/Fall Prevention:   activity supervised   fall prevention program maintained   safety round/check completed  Taken 10/31/2023 0000 by Curtis Chau RN  Safety Promotion/Fall Prevention:   activity supervised   fall prevention program maintained   safety round/check completed  Taken 10/30/2023 2200 by Curtis Chau RN  Safety Promotion/Fall Prevention:   activity supervised   fall prevention program maintained   safety round/check completed  Taken 10/30/2023 2002 by Curtis Chau  RN  Safety Promotion/Fall Prevention:   activity supervised   fall prevention program maintained   safety round/check completed     Problem: Asthma Comorbidity  Goal: Maintenance of Asthma Control  Outcome: Ongoing, Progressing  Intervention: Maintain Asthma Symptom Control  Recent Flowsheet Documentation  Taken 10/31/2023 0000 by Curtis Chau RN  Medication Review/Management: medications reviewed  Taken 10/30/2023 2200 by Curtis Chau RN  Medication Review/Management: medications reviewed  Taken 10/30/2023 2002 by Curtis Chau RN  Medication Review/Management: medications reviewed     Problem: Behavioral Health Comorbidity  Goal: Maintenance of Behavioral Health Symptom Control  Outcome: Ongoing, Progressing  Intervention: Maintain Behavioral Health Symptom Control  Recent Flowsheet Documentation  Taken 10/31/2023 0000 by Curtis Chau RN  Medication Review/Management: medications reviewed  Taken 10/30/2023 2200 by Curtis Chau RN  Medication Review/Management: medications reviewed  Taken 10/30/2023 2002 by Curtis Chau RN  Medication Review/Management: medications reviewed     Problem: COPD (Chronic Obstructive Pulmonary Disease) Comorbidity  Goal: Maintenance of COPD Symptom Control  Outcome: Ongoing, Progressing  Intervention: Maintain COPD-Symptom Control  Recent Flowsheet Documentation  Taken 10/31/2023 0000 by Curtis Chau RN  Medication Review/Management: medications reviewed  Taken 10/30/2023 2200 by Curtis Chau RN  Medication Review/Management: medications reviewed  Taken 10/30/2023 2002 by Curtis Chau RN  Medication Review/Management: medications reviewed     Problem: Diabetes Comorbidity  Goal: Blood Glucose Level Within Targeted Range  Outcome: Ongoing, Progressing     Problem: Heart Failure Comorbidity  Goal: Maintenance of Heart Failure Symptom Control  Outcome: Ongoing, Progressing  Intervention: Maintain Heart Failure-Management  Recent Flowsheet Documentation  Taken  10/31/2023 0000 by Curtis Chau RN  Medication Review/Management: medications reviewed  Taken 10/30/2023 2200 by Curtis Chau RN  Medication Review/Management: medications reviewed  Taken 10/30/2023 2002 by Curtis Chau RN  Medication Review/Management: medications reviewed     Problem: Hypertension Comorbidity  Goal: Blood Pressure in Desired Range  Outcome: Ongoing, Progressing  Intervention: Maintain Blood Pressure Management  Recent Flowsheet Documentation  Taken 10/31/2023 0000 by Curtis Chau RN  Medication Review/Management: medications reviewed  Taken 10/30/2023 2200 by Curtis Chau RN  Medication Review/Management: medications reviewed  Taken 10/30/2023 2002 by Curtis Chau RN  Medication Review/Management: medications reviewed     Problem: Obstructive Sleep Apnea Risk or Actual Comorbidity Management  Goal: Unobstructed Breathing During Sleep  Outcome: Ongoing, Progressing     Problem: Osteoarthritis Comorbidity  Goal: Maintenance of Osteoarthritis Symptom Control  Outcome: Ongoing, Progressing  Intervention: Maintain Osteoarthritis Symptom Control  Recent Flowsheet Documentation  Taken 10/31/2023 0000 by Curtis Chau RN  Medication Review/Management: medications reviewed  Taken 10/30/2023 2200 by Curtis Chau RN  Medication Review/Management: medications reviewed  Taken 10/30/2023 2002 by Curtis Chau RN  Activity Management: bedrest  Medication Review/Management: medications reviewed     Problem: Pain Chronic (Persistent) (Comorbidity Management)  Goal: Acceptable Pain Control and Functional Ability  Outcome: Ongoing, Progressing  Intervention: Manage Persistent Pain  Recent Flowsheet Documentation  Taken 10/31/2023 0000 by Curtis Chau RN  Medication Review/Management: medications reviewed  Taken 10/30/2023 2200 by Curtis Chau RN  Medication Review/Management: medications reviewed  Taken 10/30/2023 2002 by Curtis Chau RN  Medication Review/Management: medications  reviewed  Intervention: Optimize Psychosocial Wellbeing  Recent Flowsheet Documentation  Taken 10/30/2023 2002 by Curtis Chau RN  Family/Support System Care: ([place] of the state guardian) --     Problem: Seizure Disorder Comorbidity  Goal: Maintenance of Seizure Control  Outcome: Ongoing, Progressing

## 2023-11-01 LAB
ANION GAP SERPL CALCULATED.3IONS-SCNC: 8 MMOL/L (ref 5–15)
BASOPHILS # BLD AUTO: 0.06 10*3/MM3 (ref 0–0.2)
BASOPHILS NFR BLD AUTO: 0.7 % (ref 0–1.5)
BUN SERPL-MCNC: 8 MG/DL (ref 8–23)
BUN/CREAT SERPL: 11.8 (ref 7–25)
CALCIUM SPEC-SCNC: 8.9 MG/DL (ref 8.6–10.5)
CHLORIDE SERPL-SCNC: 107 MMOL/L (ref 98–107)
CO2 SERPL-SCNC: 26 MMOL/L (ref 22–29)
CREAT SERPL-MCNC: 0.68 MG/DL (ref 0.76–1.27)
DEPRECATED RDW RBC AUTO: 44.9 FL (ref 37–54)
EGFRCR SERPLBLD CKD-EPI 2021: 101.9 ML/MIN/1.73
EOSINOPHIL # BLD AUTO: 0.28 10*3/MM3 (ref 0–0.4)
EOSINOPHIL NFR BLD AUTO: 3.4 % (ref 0.3–6.2)
ERYTHROCYTE [DISTWIDTH] IN BLOOD BY AUTOMATED COUNT: 14.1 % (ref 12.3–15.4)
GLUCOSE SERPL-MCNC: 99 MG/DL (ref 65–99)
HCT VFR BLD AUTO: 28.8 % (ref 37.5–51)
HGB BLD-MCNC: 9.2 G/DL (ref 13–17.7)
IMM GRANULOCYTES # BLD AUTO: 0.06 10*3/MM3 (ref 0–0.05)
IMM GRANULOCYTES NFR BLD AUTO: 0.7 % (ref 0–0.5)
LYMPHOCYTES # BLD AUTO: 2.06 10*3/MM3 (ref 0.7–3.1)
LYMPHOCYTES NFR BLD AUTO: 25.2 % (ref 19.6–45.3)
MAGNESIUM SERPL-MCNC: 2.6 MG/DL (ref 1.6–2.4)
MCH RBC QN AUTO: 27.9 PG (ref 26.6–33)
MCHC RBC AUTO-ENTMCNC: 31.9 G/DL (ref 31.5–35.7)
MCV RBC AUTO: 87.3 FL (ref 79–97)
MONOCYTES # BLD AUTO: 0.51 10*3/MM3 (ref 0.1–0.9)
MONOCYTES NFR BLD AUTO: 6.2 % (ref 5–12)
NEUTROPHILS NFR BLD AUTO: 5.2 10*3/MM3 (ref 1.7–7)
NEUTROPHILS NFR BLD AUTO: 63.8 % (ref 42.7–76)
NRBC BLD AUTO-RTO: 0 /100 WBC (ref 0–0.2)
PHOSPHATE SERPL-MCNC: 3.5 MG/DL (ref 2.5–4.5)
PLATELET # BLD AUTO: 336 10*3/MM3 (ref 140–450)
PMV BLD AUTO: 9.9 FL (ref 6–12)
POTASSIUM SERPL-SCNC: 3.3 MMOL/L (ref 3.5–5.2)
QT INTERVAL: 484 MS
QTC INTERVAL: 500 MS
RBC # BLD AUTO: 3.3 10*6/MM3 (ref 4.14–5.8)
SODIUM SERPL-SCNC: 141 MMOL/L (ref 136–145)
VANCOMYCIN TROUGH SERPL-MCNC: 17.7 MCG/ML (ref 5–20)
WBC NRBC COR # BLD: 8.17 10*3/MM3 (ref 3.4–10.8)

## 2023-11-01 PROCEDURE — 84100 ASSAY OF PHOSPHORUS: CPT | Performed by: STUDENT IN AN ORGANIZED HEALTH CARE EDUCATION/TRAINING PROGRAM

## 2023-11-01 PROCEDURE — 25010000002 PIPERACILLIN SOD-TAZOBACTAM PER 1 G: Performed by: INTERNAL MEDICINE

## 2023-11-01 PROCEDURE — 93010 ELECTROCARDIOGRAM REPORT: CPT | Performed by: INTERNAL MEDICINE

## 2023-11-01 PROCEDURE — 85025 COMPLETE CBC W/AUTO DIFF WBC: CPT | Performed by: STUDENT IN AN ORGANIZED HEALTH CARE EDUCATION/TRAINING PROGRAM

## 2023-11-01 PROCEDURE — 25010000002 VANCOMYCIN PER 500 MG: Performed by: INTERNAL MEDICINE

## 2023-11-01 PROCEDURE — 80048 BASIC METABOLIC PNL TOTAL CA: CPT | Performed by: STUDENT IN AN ORGANIZED HEALTH CARE EDUCATION/TRAINING PROGRAM

## 2023-11-01 PROCEDURE — 93005 ELECTROCARDIOGRAM TRACING: CPT | Performed by: STUDENT IN AN ORGANIZED HEALTH CARE EDUCATION/TRAINING PROGRAM

## 2023-11-01 PROCEDURE — 80202 ASSAY OF VANCOMYCIN: CPT | Performed by: INTERNAL MEDICINE

## 2023-11-01 PROCEDURE — 25810000003 SODIUM CHLORIDE 0.9 % SOLUTION: Performed by: INTERNAL MEDICINE

## 2023-11-01 PROCEDURE — 83735 ASSAY OF MAGNESIUM: CPT | Performed by: STUDENT IN AN ORGANIZED HEALTH CARE EDUCATION/TRAINING PROGRAM

## 2023-11-01 PROCEDURE — 99222 1ST HOSP IP/OBS MODERATE 55: CPT | Performed by: INTERNAL MEDICINE

## 2023-11-01 RX ORDER — POTASSIUM CHLORIDE 750 MG/1
40 TABLET, FILM COATED, EXTENDED RELEASE ORAL EVERY 4 HOURS
Status: COMPLETED | OUTPATIENT
Start: 2023-11-01 | End: 2023-11-01

## 2023-11-01 RX ADMIN — MULTIPLE VITAMINS W/ MINERALS TAB 1 TABLET: TAB at 08:31

## 2023-11-01 RX ADMIN — POTASSIUM CHLORIDE 40 MEQ: 750 TABLET, EXTENDED RELEASE ORAL at 17:35

## 2023-11-01 RX ADMIN — DAKIN'S SOLUTION 0.125% (QUARTER STRENGTH): 0.12 SOLUTION at 17:36

## 2023-11-01 RX ADMIN — GABAPENTIN 300 MG: 300 CAPSULE ORAL at 08:31

## 2023-11-01 RX ADMIN — OXYCODONE HYDROCHLORIDE AND ACETAMINOPHEN 1000 MG: 500 TABLET ORAL at 08:31

## 2023-11-01 RX ADMIN — BACLOFEN 20 MG: 10 TABLET ORAL at 21:30

## 2023-11-01 RX ADMIN — DOCUSATE SODIUM 100 MG: 100 CAPSULE, LIQUID FILLED ORAL at 08:31

## 2023-11-01 RX ADMIN — SENNOSIDES 2 TABLET: 8.6 TABLET, FILM COATED ORAL at 08:30

## 2023-11-01 RX ADMIN — FERROUS SULFATE TAB 325 MG (65 MG ELEMENTAL FE) 325 MG: 325 (65 FE) TAB at 08:31

## 2023-11-01 RX ADMIN — FUROSEMIDE 20 MG: 20 TABLET ORAL at 08:31

## 2023-11-01 RX ADMIN — PIPERACILLIN SODIUM AND TAZOBACTAM SODIUM 3.38 G: 3; .375 INJECTION, SOLUTION INTRAVENOUS at 17:35

## 2023-11-01 RX ADMIN — FOLIC ACID 2 MG: 1 TABLET ORAL at 08:31

## 2023-11-01 RX ADMIN — Medication 2000 UNITS: at 08:31

## 2023-11-01 RX ADMIN — Medication 100 MG: at 08:30

## 2023-11-01 RX ADMIN — GABAPENTIN 300 MG: 300 CAPSULE ORAL at 17:35

## 2023-11-01 RX ADMIN — PIPERACILLIN SODIUM AND TAZOBACTAM SODIUM 3.38 G: 3; .375 INJECTION, SOLUTION INTRAVENOUS at 10:06

## 2023-11-01 RX ADMIN — APIXABAN 2.5 MG: 2.5 TABLET, FILM COATED ORAL at 21:31

## 2023-11-01 RX ADMIN — SODIUM CHLORIDE 75 ML/HR: 9 INJECTION, SOLUTION INTRAVENOUS at 08:29

## 2023-11-01 RX ADMIN — BACLOFEN 20 MG: 10 TABLET ORAL at 17:35

## 2023-11-01 RX ADMIN — SENNOSIDES 2 TABLET: 8.6 TABLET, FILM COATED ORAL at 21:30

## 2023-11-01 RX ADMIN — PIPERACILLIN SODIUM AND TAZOBACTAM SODIUM 3.38 G: 3; .375 INJECTION, SOLUTION INTRAVENOUS at 00:00

## 2023-11-01 RX ADMIN — Medication 220 MG: at 08:31

## 2023-11-01 RX ADMIN — VANCOMYCIN HYDROCHLORIDE 1000 MG: 1 INJECTION, SOLUTION INTRAVENOUS at 17:35

## 2023-11-01 RX ADMIN — BACLOFEN 20 MG: 10 TABLET ORAL at 08:31

## 2023-11-01 RX ADMIN — ATORVASTATIN CALCIUM 10 MG: 20 TABLET, FILM COATED ORAL at 21:30

## 2023-11-01 RX ADMIN — POTASSIUM CHLORIDE 40 MEQ: 750 TABLET, EXTENDED RELEASE ORAL at 21:31

## 2023-11-01 RX ADMIN — SERTRALINE 50 MG: 50 TABLET, FILM COATED ORAL at 08:30

## 2023-11-01 RX ADMIN — GABAPENTIN 300 MG: 300 CAPSULE ORAL at 21:31

## 2023-11-01 RX ADMIN — VANCOMYCIN HYDROCHLORIDE 1000 MG: 1 INJECTION, SOLUTION INTRAVENOUS at 08:30

## 2023-11-01 NOTE — PROGRESS NOTES
"[facility] Clinical Pharmacy Services: Vancomycin Monitoring Note    Misael Sharp is a 68 y.o. male who is on day 3/5 of pharmacy to dose vancomycin for Skin and Soft Tissue.    Previous Vancomycin Dose: 1000 mg every 12 hours   Updated Cultures and Sensitivities:   -10/30 Wound, foot:  proteus mirabilis  -10/30 Wound, sacral: Proteus mirabilis, GNB  Results from last 7 days   Lab Units 11/01/23  0620   VANCOMYCIN TR mcg/mL 17.70     Vitals/Labs  Ht: 172.7 cm (68\"); Wt: 79.3 kg (174 lb 12.8 oz)   Temp Readings from Last 1 Encounters:   11/01/23 99.3 °F (37.4 °C) (Oral)     Estimated Creatinine Clearance: 118.2 mL/min (A) (by C-G formula based on SCr of 0.68 mg/dL (L)).       Results from last 7 days   Lab Units 11/01/23  0620 10/31/23  0536 10/30/23  1520   CREATININE mg/dL 0.68* 0.64* 0.69*   WBC 10*3/mm3 8.17 11.38* 10.05     Assessment/Plan  Scr stable. Steady state trough returned at 17.7 mg/L with a corresponding predicted AUC of 583 mg/L*hr. The current regimen is therapeutic and will be continued.     Current Vancomycin Dose: Continue vancomycin 1000 mg IV every  12  hours; provides a predicted  mg/L.hr   Next Level Date and Time: in the next 3-4 days or sooner if clinically indicated   We will continue to monitor patient changes and renal function     Thank you for involving pharmacy in this patient's care. Please contact pharmacy with any questions or concerns.       Cheyenne Gowers, [facility]  Clinical Pharmacist  "

## 2023-11-01 NOTE — PLAN OF CARE
Goal Outcome Evaluation:      VSS throughout shift. Pt good PO intake during shift. No complaints of pain during shift. Potassium replaced per order.

## 2023-11-01 NOTE — CONSULTS
CONSULT NOTE    Infectious Diseases - Law Mcnair MD  [facility]       Patient Identification:  Name: Misael Sharp  Age: 68 y.o.  Sex: male  :  1955  MRN: [id number]             Date of Consultation: 10/31/2023      Primary Care Physician: Linus Martinez MD                               Requesting Physician: Dr. Martin  Reason for Consultation: Sacral osteomyelitis.    Impression: Patient is a 68-year-old male with complicated past medical history due to spinal cord injury and subsequent immobility has been dealing with decubitus ulcer for quite some time.  In this background patient was noted to be confused likely lethargic at the nursing facility and was sent for evaluation and was noted to have worsening sacral decubitus ulceration with subcutaneous phlegmonous changes and small foci of soft tissue gas with developing abscess and invasion of the coccyx and distal sacrum and posterior left ischium compatible with contiguous focus osteomyelitis.  General surgery service has been consulted and patient has been started on broad-spectrum antibiotics consisting of vancomycin and Zosyn.  Patient was noted to have mild leukocytosis anemia.  His mental status seems to have improved.  Patient denies any other symptoms.  This presentation in the above context is likely:  1-metabolic encephalopathy due to  2-progressively infected sacrococcygeal/left ischial decubitus ulcer with contiguous focus osteomyelitis  3-immobility and decubitus ulceration due to spinal cord injury  4-Limited database  5-neurogenic bladder with indwelling catheter in place  6-multiple skin pressure ulcers at different sites.    Recommendations/Discussions:  At this juncture I agree with the care plan for general surgery consultation, continue with vancomycin and Zosyn follow-up on wound culture results and keep a close eye on his renal function.  De-escalate antibiotic therapy based on the evolving culture results.   Treatment of contiguous focus osteomyelitis will require prolonged antibiotic therapy and its effectiveness would be dependent upon avoidance of factors that is leading to decubitus ulceration in the first place.  Mechanism in place to avoid continuous pressure injury and urinary and fecal contamination is a prerequisite for sustained improvement of prolonged antibiotic therapy after adequate debridement to address contiguous focus osteomyelitis.  If these contributing factors such as ongoing pressure injury in the stool contamination urine contamination cannot be avoided then would recommend short course of antibiotic treatment for skin and soft tissue infection after adequate debridement with aggressive local care and expect him to develop episodic systemic sepsis which would require hospitalization and similar method of intervention as he is getting here during this admission.  Discussion can take place but need for stool diversion such as colostomy and arrangements for urinary diversion with long-term catheter and wound care program with plastic surgery service expert in management of decubitus care is required before long-term antibiotic therapy for contiguous focus osteomyelitis can be recommended to obtain a sustained benefit.  Thank you Dr. Martin for letting me be the part of your patient care please see above impression and recommendations      History of Present Illness:   Patient is a 68-year-old male with complicated past medical history due to spinal cord injury and subsequent immobility has been dealing with decubitus ulcer for quite some time.  In this background patient was noted to be confused likely lethargic at the nursing facility and was sent for evaluation and was noted to have worsening sacral decubitus ulceration with subcutaneous phlegmonous changes and small foci of soft tissue gas with developing abscess and invasion of the coccyx and distal sacrum and posterior left ischium compatible  with contiguous focus osteomyelitis.  General surgery service has been consulted and patient has been started on broad-spectrum antibiotics consisting of vancomycin and Zosyn.  Patient was noted to have mild leukocytosis anemia.  His mental status seems to have improved.  Patient denies any other symptoms.      Past Medical History:  History reviewed. No pertinent past medical history.  Past Surgical History:  History reviewed. No pertinent surgical history.   Home Meds:  Medications Prior to Admission   Medication Sig Dispense Refill Last Dose    acetaminophen (TYLENOL) 325 MG tablet Take 2 tablets by mouth Every 8 (Eight) Hours As Needed for Mild Pain or Fever.       apixaban (ELIQUIS) 2.5 MG tablet tablet Take 1 tablet by mouth 2 (Two) Times a Day.   10/30/2023    ascorbic acid (VITAMIN C) 500 MG tablet Take 2 tablets by mouth Daily.   10/30/2023    atorvastatin (LIPITOR) 10 MG tablet Take 1 tablet by mouth Every Night.   10/29/2023    baclofen (LIORESAL) 20 MG tablet Take 1 tablet by mouth 3 (Three) Times a Day.   10/30/2023    Cholecalciferol 25 MCG (1000 UT) tablet Take 2 tablets by mouth Daily.   10/30/2023    collagenase 250 UNIT/GM ointment Apply 1 application  topically to the appropriate area as directed Daily. To R heel and R buttocks   10/30/2023    docusate sodium (COLACE) 100 MG capsule Take 1 capsule by mouth Daily.   10/30/2023    ferrous gluconate (FERGON) 240 (27 FE) MG tablet Take 1 tablet by mouth Daily With Breakfast.   10/30/2023    folic acid (FOLVITE) 1 MG tablet Take 2 tablets by mouth Daily.   10/30/2023    furosemide (LASIX) 20 MG tablet Take 1 tablet by mouth Daily.   10/30/2023    gabapentin (NEURONTIN) 100 MG capsule Take 3 capsules by mouth 3 (Three) Times a Day.   10/30/2023    HYDROcodone-acetaminophen (NORCO) 5-325 MG per tablet Take 1 tablet by mouth Every 4 (Four) Hours As Needed for Moderate Pain.   10/28/2023    hydrocortisone 1 % lotion Apply 1 application  topically to the  appropriate area as directed 3 (Three) Times a Day. To R chest   10/30/2023    ipratropium-albuterol (COMBIVENT RESPIMAT)  MCG/ACT inhaler Inhale 1 puff 4 (Four) Times a Day As Needed for Wheezing or Shortness of Air.       multivitamin with minerals (MULTIVITAMIN ADULT PO) Take 1 tablet by mouth Daily.   10/30/2023    potassium chloride (MICRO-K) 10 MEQ CR capsule Take 1 capsule by mouth Daily.   10/30/2023    senna 8.6 MG tablet Take 2 tablets by mouth 2 (Two) Times a Day.   10/30/2023    sertraline (ZOLOFT) 50 MG tablet Take 1 tablet by mouth Daily.   10/30/2023    sodium hypochlorite 0.25 % solution topical solution Apply 1 application  topically to the appropriate area as directed Every 12 (Twelve) Hours. To R heel   10/30/2023    thiamine (VITAMIN B-1) 100 MG tablet  tablet Take 1 tablet by mouth Daily.   10/30/2023    tiZANidine (ZANAFLEX) 2 MG tablet Take 1 tablet by mouth Every 8 (Eight) Hours As Needed for Muscle Spasms.   10/23/2023    Zinc Sulfate 220 (50 Zn) MG tablet Take 1 tablet by mouth Daily.   10/30/2023    bisacodyl (DULCOLAX) 10 MG suppository Insert 1 suppository into the rectum Daily As Needed for Constipation.   More than a month    polyethylene glycol (MIRALAX) 17 g packet Take 17 g by mouth Daily As Needed (constipation).   More than a month     Current Meds:     Current Facility-Administered Medications:     acetaminophen (TYLENOL) tablet 650 mg, 650 mg, Oral, Q4H PRN, Stingl, Antionette Hollins MD, 650 mg at 10/31/23 0943    ascorbic acid (VITAMIN C) tablet 1,000 mg, 1,000 mg, Oral, Daily, Eboni Martin MD, 1,000 mg at 10/31/23 1833    atorvastatin (LIPITOR) tablet 10 mg, 10 mg, Oral, Nightly, Eboni Martin MD, 10 mg at 10/31/23 2131    baclofen (LIORESAL) tablet 20 mg, 20 mg, Oral, TID, Eboni Martin MD, 20 mg at 10/31/23 213    [DISCONTINUED] sennosides-docusate (PERICOLACE) 8.6-50 MG per tablet 2 tablet, 2 tablet, Oral, BID **AND** polyethylene glycol (MIRALAX)  packet 17 g, 17 g, Oral, Daily PRN **AND** bisacodyl (DULCOLAX) EC tablet 5 mg, 5 mg, Oral, Daily PRN **AND** bisacodyl (DULCOLAX) suppository 10 mg, 10 mg, Rectal, Daily PRN, StingAntionette teague MD    cholecalciferol (VITAMIN D3) tablet 2,000 Units, 2,000 Units, Oral, Daily, Eboni Martin MD, 2,000 Units at 10/31/23 1832    docusate sodium (COLACE) capsule 100 mg, 100 mg, Oral, Daily, Eboni Martin MD, 100 mg at 10/31/23 1833    Enoxaparin Sodium (LOVENOX) syringe 40 mg, 40 mg, Subcutaneous, Q24H, Eboni Martin MD, 40 mg at 10/31/23 1833    [START ON 11/1/2023] ferrous sulfate tablet 325 mg, 325 mg, Oral, Daily With Breakfast, Eboni Martin MD    folic acid (FOLVITE) tablet 2 mg, 2 mg, Oral, Daily, Eboni Martin MD, 2 mg at 10/31/23 1832    furosemide (LASIX) tablet 20 mg, 20 mg, Oral, Daily, Eboni Martin MD, 20 mg at 10/31/23 1832    gabapentin (NEURONTIN) capsule 300 mg, 300 mg, Oral, TID, Eboni Martin MD, 300 mg at 10/31/23 2131    HYDROcodone-acetaminophen (NORCO) 5-325 MG per tablet 1 tablet, 1 tablet, Oral, Q4H PRN, Eboni Martin MD    ipratropium-albuterol (DUO-NEB) nebulizer solution 3 mL, 3 mL, Nebulization, Q6H PRN, Eboni Martin MD    melatonin tablet 3 mg, 3 mg, Oral, Nightly PRN, StinglAntionette MD    multivitamin with minerals 1 tablet, 1 tablet, Oral, Daily, Eboni Martin MD, 1 tablet at 10/31/23 1832    ondansetron (ZOFRAN) tablet 4 mg, 4 mg, Oral, Q6H PRN **OR** ondansetron (ZOFRAN) injection 4 mg, 4 mg, Intravenous, Q6H PRN, Antionette Rodriguez MD    Pharmacy to dose vancomycin, , Does not apply, Continuous PRN, Antionette Rodriguez MD    piperacillin-tazobactam (ZOSYN) 3.375 g in iso-osmotic dextrose 50 ml (premix), 3.375 g, Intravenous, Q8H, Antionette Rodriguez MD, 3.375 g at 10/31/23 1653    senna (SENOKOT) tablet 2 tablet, 2 tablet, Oral, BID, Eboni Martin MD    sertraline (ZOLOFT) tablet 50 mg, 50 mg, Oral,  Daily, Eboni Martin MD    [COMPLETED] Insert Peripheral IV, , , Once **AND** sodium chloride 0.9 % flush 10 mL, 10 mL, Intravenous, PRN, Karri Gabriel MD, 10 mL at 10/31/23 2131    sodium chloride 0.9 % infusion, 75 mL/hr, Intravenous, Continuous, Antionette Rodriguez MD, Last Rate: 75 mL/hr at 10/31/23 1018, 75 mL/hr at 10/31/23 1018    sodium hypochlorite (DAKIN'S 1/4 STRENGTH) 0.125 % topical solution 0.125% solution, , Topical, Q12H, Eboni Martin MD    thiamine (VITAMIN B-1) tablet 100 mg, 100 mg, Oral, Daily, Eboni Martin MD, 100 mg at 10/31/23 1832    tiZANidine (ZANAFLEX) tablet 2 mg, 2 mg, Oral, Q8H PRN, Eboni Martin MD    vancomycin (VANCOCIN) 1000 mg/200 mL dextrose 5% IVPB, 1,000 mg, Intravenous, Q12H, StingAntionette teague MD, 1,000 mg at 10/31/23 1830    zinc sulfate (ZINCATE) capsule 220 mg, 220 mg, Oral, Daily, Eboni Martin MD, 220 mg at 10/31/23 1833  Allergies:  No Known Allergies  Social History:   Social History     Tobacco Use    Smoking status: Unknown    Smokeless tobacco: Not on file   Substance Use Topics    Alcohol use: Not on file     Comment: unknown      Family History:  History reviewed. No pertinent family history.       Review of Systems  See history of present illness and past medical history.  Patient denies headache, dizziness, syncope, falls, trauma, change in vision, change in hearing, change in taste, changes in weight, changes in appetite, focal weakness, numbness, or paresthesia.  Patient denies chest pain, palpitations, dyspnea, orthopnea, PND, cough, sinus pressure, rhinorrhea, epistaxis, hemoptysis, nausea, vomiting,hematemesis, diarrhea, constipation or hematchezia.  Denies cold or heat intolerance, polydipsia, polyuria, polyphagia. Denies hematuria, pyuria, dysuria, hesitancy, frequency or urgency. Denies consumption of raw and under cooked meats foods or change in water source.  Denies fever, chills, sweats, night sweats.  Denies  "missing any routine medications. Remainder of ROS is negative.      Vitals:   BP 92/79 (BP Location: Left arm, Patient Position: Lying)   Pulse 75   Temp 99 °F (37.2 °C) (Oral)   Resp 18   Ht 172.7 cm (68\")   Wt 79.3 kg (174 lb 12.8 oz)   SpO2 98%   BMI 26.58 kg/m²   I/O:   Intake/Output Summary (Last 24 hours) at 10/31/2023 2144  Last data filed at 10/31/2023 1803  Gross per 24 hour   Intake 220 ml   Output 1250 ml   Net -1030 ml     Exam:  Patient is examined using the personal protective equipment as per guidelines from infection control for this particular patient as enacted.  Hand washing was performed before and after patient interaction.  General Appearance:  Awake cooperative chronically ill nontoxic-appearing male   Head:    Normocephalic, without obvious abnormality, atraumatic   Eyes:    PERRL, conjunctivae/corneas clear, EOM's intact, both eyes   Ears:    Normal external ear canals, both ears   Nose:   Nares normal, septum midline, mucosa normal, no drainage    or sinus tenderness   Throat:   Lips, tongue, gums normal; oral mucosa pink and moist   Neck:   Supple, symmetrical, trachea midline, no adenopathy;     thyroid:  no enlargement/tenderness/nodules; no carotid    bruit or JVD   Back:     Symmetric, no curvature, ROM normal, no CVA tenderness   Lungs:     Clear to auscultation bilaterally, respirations unlabored   Chest Wall:    No tenderness or deformity    Heart:  S1-S2 regular   Abdomen:   Soft nontender, indwelling catheter in place   Extremities: Contractures and disuse atrophy noted   Pulses:   Pulses palpable in all extremities; symmetric all extremities   Skin:                Neurologic: Bilateral lower extremity and upper extremity weakness       Data Review:    I reviewed the patient's new clinical results.  Results from last 7 days   Lab Units 10/31/23  0536 10/30/23  1520   WBC 10*3/mm3 11.38* 10.05   HEMOGLOBIN g/dL 9.2* 10.3*   PLATELETS 10*3/mm3 316 300     Results from last 7 " days   Lab Units 10/31/23  0536 10/30/23  1520   SODIUM mmol/L 142 141   POTASSIUM mmol/L 3.7 4.2   CHLORIDE mmol/L 107 104   CO2 mmol/L 25.0 28.3   BUN mg/dL 10 13   CREATININE mg/dL 0.64* 0.69*   CALCIUM mg/dL 8.8 9.3   GLUCOSE mg/dL 102* 96     CT Head Without Contrast    Result Date: 10/30/2023  No acute process is identified. Further evaluation could be performed with MRI examination of the brain as indicated.    Radiation dose reduction techniques were utilized, including automated exposure control and exposure modulation based on body size.   This report was finalized on 10/30/2023 8:34 PM by Dr. Hai Rees M.D on Workstation: [id number]      CT Pelvis Without Contrast    Result Date: 10/30/2023   As described.    This report was finalized on 10/30/2023 5:02 PM by Dr. Chiki Pepper M.D on Workstation: [id number]      XR Chest 1 View    Result Date: 10/30/2023  1. Borderline cardiomegaly. 2. Elevated left hemidiaphragm.   This report was finalized on 10/30/2023 4:15 PM by Dr. Willie Sosa M.D on Workstation: [id number]     Microbiology Results (last 10 days)       Procedure Component Value - Date/Time    Blood Culture - Blood, Arm, Left [[id number]]  (Normal) Collected: 10/30/23 2101    Lab Status: Preliminary result Specimen: Blood from Arm, Left Updated: 10/31/23 2115     Blood Culture No growth at 24 hours    Narrative:      Less than seven (7) mL's of blood was collected.  Insufficient quantity may yield false negative results.    Blood Culture - Blood, Arm, Right [[id number]]  (Normal) Collected: 10/30/23 2054    Lab Status: Preliminary result Specimen: Blood from Arm, Right Updated: 10/31/23 2115     Blood Culture No growth at 24 hours    Narrative:      Less than seven (7) mL's of blood was collected.  Insufficient quantity may yield false negative results.    Urine Culture - Urine, Urine, Catheter [[id number]] Collected: 10/30/23 1547    Lab Status: Final result Specimen: Urine, Catheter Updated:  10/31/23 1243     Urine Culture >100,000 CFU/mL Mixed Karlene Isolated    Narrative:      Specimen contains mixed organisms of questionable pathogenicity suggestive of contamination. If symptoms persist, suggest recollection.  Colonization of the urinary tract without infection is common. Treatment is discouraged unless the patient is symptomatic, pregnant, or undergoing an invasive urologic procedure.    Wound Culture - Wound, Foot, Right [[id number]]  (Abnormal) Collected: 10/30/23 1537    Lab Status: Preliminary result Specimen: Wound from Foot, Right Updated: 10/31/23 0958     Wound Culture Rare Gram Negative Bacilli     Gram Stain Moderate (3+) WBCs per low power field      Few (2+) Gram positive cocci    Wound Culture - Wound, Spine, Sacral [[id number]]  (Abnormal) Collected: 10/30/23 1537    Lab Status: Preliminary result Specimen: Wound from Spine, Sacral Updated: 10/31/23 0951     Wound Culture Light growth (2+) Gram Negative Bacilli     Gram Stain Rare (1+) WBCs per low power field      Many (4+) Gram positive cocci    Wound Culture - Wound, Spine, Sacral [[id number]]  (Abnormal) Collected: 10/30/23 1537    Lab Status: Preliminary result Specimen: Wound from Spine, Sacral Updated: 10/31/23 0956     Wound Culture Rare Gram Negative Bacilli     Gram Stain Rare (1+) WBCs per low power field      Rare (1+) Gram positive cocci    Wound Culture - Wound, Spine, Sacral [[id number]]  (Abnormal) Collected: 10/30/23 1537    Lab Status: Preliminary result Specimen: Wound from Spine, Sacral Updated: 10/31/23 0956     Wound Culture Rare Gram Negative Bacilli     Gram Stain Few (2+) WBCs per low power field      No organisms seen              Assessment:  Active Hospital Problems    Diagnosis  POA    **Altered mental status [R41.82]  Yes    Decubitus ulcer [L89.90]  Unknown    Sacral osteomyelitis [M46.28]  Unknown    Anemia [D64.9]  Unknown    Elevated troponin [R79.89]  Unknown      Resolved Hospital Problems   No resolved  problems to display.         Plan:  See above  Law Silva MD   10/31/2023  21:44 EDT    Parts of this note may be an electronic transcription/translation of spoken language to printed text using the Dragon dictation system.

## 2023-11-01 NOTE — CONSULTS
Patient Name: Misael Sharp  :1955  68 y.o.    Date of Admission: 10/30/2023  Date of Consultation:  23  Encounter Provider: Jovanni Jackman III, MD  Place of Service: [facility] CARDIOLOGY  Referring Provider: Antionette Rodriguez MD  Patient Care Team:  Linus Martinez MD as PCP - General (Internal Medicine)      Chief complaint: MS changes    History of Present Illness:    68-year-old male who was admitted with mental status changes.  He apparently has a history of spinal cord injury by admission was noted to have sacral decubiti ulcers.  He may have a history of DVT, thus not clear, but he has been on chronic anticoagulation.  Currently lives in a nursing home.    We have been consulted to see him because of episodes of bigeminy.    Patient is a very poor historian.  No apparent history of any cardiac issues in the past.  Certainly no evidence of any cardiac problems that are available in epic.  No evidence of prior echocardiogram or stress test, no history of coronary disease, congestive heart failure, rheumatic fever, rheumatic heart disease, or congenital heart disease.    No complaints of chest pain or chest discomfort.  No complaints of palpitations.    History reviewed. No pertinent past medical history.    History reviewed. No pertinent surgical history.      Prior to Admission medications    Medication Sig Start Date End Date Taking? Authorizing Provider   acetaminophen (TYLENOL) 325 MG tablet Take 2 tablets by mouth Every 8 (Eight) Hours As Needed for Mild Pain or Fever.   Yes ProviderSavita MD   apixaban (ELIQUIS) 2.5 MG tablet tablet Take 1 tablet by mouth 2 (Two) Times a Day.   Yes Provider, MD Savita   ascorbic acid (VITAMIN C) 500 MG tablet Take 2 tablets by mouth Daily.   Yes Provider, MD Savita   atorvastatin (LIPITOR) 10 MG tablet Take 1 tablet by mouth Every Night.   Yes ProviderSavita MD   baclofen (LIORESAL) 20 MG tablet  Take 1 tablet by mouth 3 (Three) Times a Day.   Yes Savita Wong MD   Cholecalciferol 25 MCG (1000 UT) tablet Take 2 tablets by mouth Daily.   Yes Savita Wong MD   collagenase 250 UNIT/GM ointment Apply 1 application  topically to the appropriate area as directed Daily. To R heel and R buttocks   Yes Savita Wong MD   docusate sodium (COLACE) 100 MG capsule Take 1 capsule by mouth Daily.   Yes Savita Wong MD   ferrous gluconate (FERGON) 240 (27 FE) MG tablet Take 1 tablet by mouth Daily With Breakfast.   Yes Savita Wong MD   folic acid (FOLVITE) 1 MG tablet Take 2 tablets by mouth Daily.   Yes Savita Wong MD   furosemide (LASIX) 20 MG tablet Take 1 tablet by mouth Daily.   Yes Savita Wong MD   gabapentin (NEURONTIN) 100 MG capsule Take 3 capsules by mouth 3 (Three) Times a Day.   Yes Savita Wong MD   HYDROcodone-acetaminophen (NORCO) 5-325 MG per tablet Take 1 tablet by mouth Every 4 (Four) Hours As Needed for Moderate Pain.   Yes Savita Wong MD   hydrocortisone 1 % lotion Apply 1 application  topically to the appropriate area as directed 3 (Three) Times a Day. To R chest   Yes Savita Wong MD   ipratropium-albuterol (COMBIVENT RESPIMAT)  MCG/ACT inhaler Inhale 1 puff 4 (Four) Times a Day As Needed for Wheezing or Shortness of Air.   Yes Savita Wong MD   multivitamin with minerals (MULTIVITAMIN ADULT PO) Take 1 tablet by mouth Daily.   Yes Savita Wong MD   potassium chloride (MICRO-K) 10 MEQ CR capsule Take 1 capsule by mouth Daily.   Yes Savita Wong MD   senna 8.6 MG tablet Take 2 tablets by mouth 2 (Two) Times a Day.   Yes Savita oWng MD   sertraline (ZOLOFT) 50 MG tablet Take 1 tablet by mouth Daily.   Yes Savita Wong MD   sodium hypochlorite 0.25 % solution topical solution Apply 1 application  topically to the appropriate area as directed Every 12 (Twelve)  Hours. To R heel   Yes Savita Wong MD   thiamine (VITAMIN B-1) 100 MG tablet  tablet Take 1 tablet by mouth Daily.   Yes Savita Wong MD   tiZANidine (ZANAFLEX) 2 MG tablet Take 1 tablet by mouth Every 8 (Eight) Hours As Needed for Muscle Spasms.   Yes Savita Wong MD   Zinc Sulfate 220 (50 Zn) MG tablet Take 1 tablet by mouth Daily.   Yes Savita Wong MD   bisacodyl (DULCOLAX) 10 MG suppository Insert 1 suppository into the rectum Daily As Needed for Constipation.    Savita Wong MD   polyethylene glycol (MIRALAX) 17 g packet Take 17 g by mouth Daily As Needed (constipation).    Savita Wong MD       No Known Allergies    Social History     Socioeconomic History    Marital status: Unknown   Tobacco Use    Smoking status: Never     Passive exposure: Never (MIKHAIL)    Smokeless tobacco: Never   Vaping Use    Vaping Use: Never used    Passive vaping exposure: Passive vaping exposure comment (unknown)   Substance and Sexual Activity    Alcohol use: Not Currently     Comment: unknown    Drug use: Never     Comment: unknown    Sexual activity: Defer       History reviewed. No pertinent family history.    REVIEW OF SYSTEMS:   All systems reviewed.  Pertinent positives identified in HPI.  All other systems are negative.      Objective:     Vitals:    10/31/23 2043 11/01/23 0407 11/01/23 0825 11/01/23 1500   BP: 92/79 91/59 117/66 117/70   BP Location: Left arm Left arm Left arm Left arm   Patient Position: Lying Sitting Lying Lying   Pulse:       Resp: 18 18 16 16   Temp: 99 °F (37.2 °C) 99.3 °F (37.4 °C) 97.7 °F (36.5 °C) 97.8 °F (36.6 °C)   TempSrc: Oral Oral Oral Oral   SpO2:       Weight:       Height:         Body mass index is 26.58 kg/m².    Physical Exam:  General Appearance:    Alert, cooperative, in no acute distress   Head:    Normocephalic, without obvious abnormality   Eyes:            Lids and lashes normal, conjunctivae and sclerae normal, no icterus, no  pallor, corneas clear   Ears:    Ears appear intact with no abnormalities noted   Throat:   No oral lesions, oral mucosa moist   Neck:   No adenopathy, supple, trachea midline, no thyromegaly, no carotid bruit, no JVD   Back:     No kyphosis present, no erythema or scars, no tenderness to palpation    Lungs:     Clear to auscultation,respirations regular, even and unlabored    Heart:    Regular rhythm and normal rate, normal S1 and S2, no murmur, no gallop, no rub, no click   Chest Wall:    No abnormalities observed   Abdomen:     Normal bowel sounds, no masses, no organomegaly, soft        non-tender, non-distended, no guarding   Extremities:   Moves all extremities well, no edema, no cyanosis, no redness   Pulses:  Bilateral carotids brisk   Skin:  Psychiatric:   No bleeding or rash    Alert and oriented, normal mood and affect         Lab Review:     Results from last 7 days   Lab Units 11/01/23  0620 10/31/23  0536 10/30/23  1520   SODIUM mmol/L 141   < > 141   POTASSIUM mmol/L 3.3*   < > 4.2   CHLORIDE mmol/L 107   < > 104   CO2 mmol/L 26.0   < > 28.3   BUN mg/dL 8   < > 13   CREATININE mg/dL 0.68*   < > 0.69*   CALCIUM mg/dL 8.9   < > 9.3   BILIRUBIN mg/dL  --   --  0.4   ALK PHOS U/L  --   --  126*   ALT (SGPT) U/L  --   --  18   AST (SGOT) U/L  --   --  28   GLUCOSE mg/dL 99   < > 96    < > = values in this interval not displayed.     Results from last 7 days   Lab Units 10/30/23  1731 10/30/23  1520   HSTROP T ng/L 38* 38*     Results from last 7 days   Lab Units 11/01/23  0620   WBC 10*3/mm3 8.17   HEMOGLOBIN g/dL 9.2*   HEMATOCRIT % 28.8*   PLATELETS 10*3/mm3 336         Results from last 7 days   Lab Units 11/01/23  0620   MAGNESIUM mg/dL 2.6*                   I personally viewed and interpreted the patient's EKG/Telemetry data.      Current Facility-Administered Medications:     acetaminophen (TYLENOL) tablet 650 mg, 650 mg, Oral, Q4H PRN, StinglAntionette MD, 650 mg at 10/31/23 0943    apixaban  (ELIQUIS) tablet 2.5 mg, 2.5 mg, Oral, BID, Eboni Martin MD    ascorbic acid (VITAMIN C) tablet 1,000 mg, 1,000 mg, Oral, Daily, Eboni Martin MD, 1,000 mg at 11/01/23 0831    atorvastatin (LIPITOR) tablet 10 mg, 10 mg, Oral, Nightly, Eboni Martin MD, 10 mg at 10/31/23 2131    baclofen (LIORESAL) tablet 20 mg, 20 mg, Oral, TID, Eboni Martin MD, 20 mg at 11/01/23 0831    [DISCONTINUED] sennosides-docusate (PERICOLACE) 8.6-50 MG per tablet 2 tablet, 2 tablet, Oral, BID **AND** polyethylene glycol (MIRALAX) packet 17 g, 17 g, Oral, Daily PRN **AND** bisacodyl (DULCOLAX) EC tablet 5 mg, 5 mg, Oral, Daily PRN **AND** bisacodyl (DULCOLAX) suppository 10 mg, 10 mg, Rectal, Daily PRN, Antionette Rodriguez MD    cholecalciferol (VITAMIN D3) tablet 2,000 Units, 2,000 Units, Oral, Daily, Eboni Martin MD, 2,000 Units at 11/01/23 0831    docusate sodium (COLACE) capsule 100 mg, 100 mg, Oral, Daily, Eboni Martin MD, 100 mg at 11/01/23 0831    ferrous sulfate tablet 325 mg, 325 mg, Oral, Daily With Breakfast, Eboni Martin MD, 325 mg at 11/01/23 0831    folic acid (FOLVITE) tablet 2 mg, 2 mg, Oral, Daily, Eboni Martin MD, 2 mg at 11/01/23 0831    furosemide (LASIX) tablet 20 mg, 20 mg, Oral, Daily, Eboni Martin MD, 20 mg at 11/01/23 0831    gabapentin (NEURONTIN) capsule 300 mg, 300 mg, Oral, TID, Eboni Martin MD, 300 mg at 11/01/23 0831    HYDROcodone-acetaminophen (NORCO) 5-325 MG per tablet 1 tablet, 1 tablet, Oral, Q4H PRN, Eboni Martin MD, 1 tablet at 10/31/23 2354    ipratropium-albuterol (DUO-NEB) nebulizer solution 3 mL, 3 mL, Nebulization, Q6H PRN, Eboni Martin MD    melatonin tablet 3 mg, 3 mg, Oral, Nightly PRN, Antionette Rodriguez MD    multivitamin with minerals 1 tablet, 1 tablet, Oral, Daily, Eboni Martin MD, 1 tablet at 11/01/23 0831    ondansetron (ZOFRAN) tablet 4 mg, 4 mg, Oral, Q6H PRN **OR** ondansetron (ZOFRAN)  injection 4 mg, 4 mg, Intravenous, Q6H PRN, Antionette Rodriguez MD    Pharmacy to dose vancomycin, , Does not apply, Continuous PRN, Antionette Rodriguez MD    piperacillin-tazobactam (ZOSYN) 3.375 g in iso-osmotic dextrose 50 ml (premix), 3.375 g, Intravenous, Q8H, Antionette Rodriguez MD, 3.375 g at 11/01/23 1006    potassium chloride (K-DUR,KLOR-CON) ER tablet 40 mEq, 40 mEq, Oral, Q4H, Eboni Martin MD    senna (SENOKOT) tablet 2 tablet, 2 tablet, Oral, BID, Eboni Martin MD, 2 tablet at 11/01/23 0830    sertraline (ZOLOFT) tablet 50 mg, 50 mg, Oral, Daily, Eboni Martin MD, 50 mg at 11/01/23 0830    [COMPLETED] Insert Peripheral IV, , , Once **AND** sodium chloride 0.9 % flush 10 mL, 10 mL, Intravenous, PRN, Karri Gabriel MD, 10 mL at 10/31/23 2131    sodium chloride 0.9 % infusion, 75 mL/hr, Intravenous, Continuous, Antionette Rodriguez MD, Last Rate: 75 mL/hr at 11/01/23 0829, 75 mL/hr at 11/01/23 0829    sodium hypochlorite (DAKIN'S 1/4 STRENGTH) 0.125 % topical solution 0.125% solution, , Topical, Q12H, Eboni Martin MD    thiamine (VITAMIN B-1) tablet 100 mg, 100 mg, Oral, Daily, Eboni Martin MD, 100 mg at 11/01/23 0830    tiZANidine (ZANAFLEX) tablet 2 mg, 2 mg, Oral, Q8H PRN, Eboni Martin MD    vancomycin (VANCOCIN) 1000 mg/200 mL dextrose 5% IVPB, 1,000 mg, Intravenous, Q12H, Antionette Rodriguez MD, 1,000 mg at 11/01/23 0830    zinc sulfate (ZINCATE) capsule 220 mg, 220 mg, Oral, Daily, Eboni Martin MD, 220 mg at 11/01/23 0831    Assessment and Plan:       Active Hospital Problems    Diagnosis  POA    **Altered mental status [R41.82]  Yes    Decubitus ulcer [L89.90]  Unknown    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     1.  Altered mental status, evaluation underway  2.  Ventricular bigeminy, intermittent episodes  of bigeminy, patient denies any symptoms but a very poor historian.  No known cardiac history.  We will obtain an echocardiogram.  Troponin is above reference but flat with no delta, no evidence of ACS or myocardial infarction.    Jovanni Jackman III, MD  11/01/23  16:01 EDT

## 2023-11-01 NOTE — PROGRESS NOTES
Name: Misael Sharp ADMIT: 10/30/2023   : 1955  PCP: Linus Martinez MD    MRN: [id number] LOS: 1 days   AGE/SEX: 68 y.o. male  ROOM: [facility]     Subjective   Subjective   Resting in bed, in no apparent distress. Surgery evaluated patient and did not find any wounds that required debridement. Patient has no complaints at this time.        Objective   Objective   Vital Signs  Temp:  [97.7 °F (36.5 °C)-99.3 °F (37.4 °C)] 97.7 °F (36.5 °C)  Resp:  [16-18] 16  BP: ()/(56-79) 117/66     on  Flow (L/min):  [2] 2;   Device (Oxygen Therapy): nasal cannula  Body mass index is 26.58 kg/m².  Physical Exam  Constitutional:       General: He is not in acute distress.     Appearance: He is ill-appearing. He is not toxic-appearing.   Cardiovascular:      Rate and Rhythm: Normal rate and regular rhythm.   Pulmonary:      Effort: Pulmonary effort is normal. No respiratory distress.      Breath sounds: Normal breath sounds. No wheezing.   Abdominal:      Palpations: Abdomen is soft.      Tenderness: There is no abdominal tenderness.   Musculoskeletal:         General: No tenderness.      Right lower leg: No edema.      Left lower leg: No edema.      Comments: B/l LE atrophied   Skin:     General: Skin is warm and dry.   Neurological:      General: No focal deficit present.      Mental Status: He is alert and oriented to person, place, and time. Mental status is at baseline.      Motor: No weakness.         Results Review     I reviewed the patient's new clinical results.  Results from last 7 days   Lab Units 23  0620 10/31/23  0536 10/30/23  1520   WBC 10*3/mm3 8.17 11.38* 10.05   HEMOGLOBIN g/dL 9.2* 9.2* 10.3*   PLATELETS 10*3/mm3 336 316 300     Results from last 7 days   Lab Units 23  0620 10/31/23  0536 10/30/23  1520   SODIUM mmol/L 141 142 141   POTASSIUM mmol/L 3.3* 3.7 4.2   CHLORIDE mmol/L 107 107 104   CO2 mmol/L 26.0 25.0 28.3   BUN mg/dL 8 10 13   CREATININE mg/dL 0.68* 0.64* 0.69*   GLUCOSE  "mg/dL 99 102* 96   Estimated Creatinine Clearance: 118.2 mL/min (A) (by C-G formula based on SCr of 0.68 mg/dL (L)).  Results from last 7 days   Lab Units 10/30/23  1520   ALBUMIN g/dL 2.9*   BILIRUBIN mg/dL 0.4   ALK PHOS U/L 126*   AST (SGOT) U/L 28   ALT (SGPT) U/L 18     Results from last 7 days   Lab Units 11/01/23  0620 10/31/23  0536 10/30/23  1520   CALCIUM mg/dL 8.9 8.8 9.3   ALBUMIN g/dL  --   --  2.9*   MAGNESIUM mg/dL 2.6*  --   --    PHOSPHORUS mg/dL 3.5  --   --      Results from last 7 days   Lab Units 10/30/23  1520   LACTATE mmol/L 1.0   No results found for: \"COVID19\"  Glucose   Date/Time Value Ref Range Status   10/31/2023 0641 105 70 - 130 mg/dL Final   10/31/2023 0011 116 70 - 130 mg/dL Final       CT Head Without Contrast  Narrative: CT HEAD WITHOUT CONTRAST     HISTORY:  Confusion.     COMPARISON: None.     FINDINGS: The brain and ventricles are symmetrical. There is no evidence  of hemorrhage, hydrocephalus or of abnormal extra-axial fluid. No focal  area of decreased attenuation to suggest acute infarction is identified.     Impression: No acute process is identified. Further evaluation could be  performed with MRI examination of the brain as indicated.           Radiation dose reduction techniques were utilized, including automated  exposure control and exposure modulation based on body size.        This report was finalized on 10/30/2023 8:34 PM by Dr. Hai Rees M.D on Workstation: [id number]     CT Pelvis Without Contrast  Narrative: CT PELVIS WO CONTRAST-     INDICATIONS: Sacral decubitus ulcer. Radiation dose reduction techniques  were utilized, including automated exposure control and exposure  modulation based on body size.     TECHNIQUE: NONCONTRAST PELVIS CT     COMPARISON: None available     FINDINGS:     Sacral decubitus ulcer is apparent, subcutaneous phlegmonous change, and  small foci of soft tissue gas that may be evidence of developing  abscess, for example axial image " 77, follow-up advised. Erosion of the  coccyx and distal sacrum and posterior left ischium is noted, compatible  with osteomyelitis. No acute fractures are noted.     Urinary bladder is catheterized. Gas in the urinary bladder may be from  catheterization or may be evidence of urinary infection. Likewise,  bladder wall thickening may be from lack of distention and/or infection.     Scattered arterial calcifications are present.        Impression:    As described.           This report was finalized on 10/30/2023 5:02 PM by Dr. Chiki Pepper M.D on Workstation: [id number]     XR Chest 1 View  Narrative: XR CHEST 1 VW-10/30/2023     HISTORY: Altered mental status.     Heart size is at the upper limits of normal. There is elevation of the  left hemidiaphragm. Lungs appear clear. Cervical fusion plate is seen.  Bones and soft tissues are otherwise unremarkable.     Impression: 1. Borderline cardiomegaly.  2. Elevated left hemidiaphragm.        This report was finalized on 10/30/2023 4:15 PM by Dr. Willie Sosa M.D on Workstation: [id number]       Scheduled Medications  ascorbic acid, 1,000 mg, Oral, Daily  atorvastatin, 10 mg, Oral, Nightly  baclofen, 20 mg, Oral, TID  cholecalciferol, 2,000 Units, Oral, Daily  docusate sodium, 100 mg, Oral, Daily  enoxaparin, 40 mg, Subcutaneous, Q24H  ferrous sulfate, 325 mg, Oral, Daily With Breakfast  folic acid, 2 mg, Oral, Daily  furosemide, 20 mg, Oral, Daily  gabapentin, 300 mg, Oral, TID  multivitamin with minerals, 1 tablet, Oral, Daily  piperacillin-tazobactam, 3.375 g, Intravenous, Q8H  senna, 2 tablet, Oral, BID  sertraline, 50 mg, Oral, Daily  sodium hypochlorite, , Topical, Q12H  thiamine, 100 mg, Oral, Daily  vancomycin, 1,000 mg, Intravenous, Q12H  zinc sulfate, 220 mg, Oral, Daily    Infusions  Pharmacy to dose vancomycin,   sodium chloride, 75 mL/hr, Last Rate: 75 mL/hr (11/01/23 0829)    Diet  Diet: Cardiac Diets; Healthy Heart (2-3 Na+); Texture: Regular  Texture (IDDSI 7); Fluid Consistency: Thin (IDDSI 0)         I have personally reviewed:  [x]  Laboratory   []  Microbiology   [x]  Radiology   [x]  EKG/Telemetry   []  Cardiology/Vascular   []  Pathology   [x]  Records    Assessment/Plan     Active Hospital Problems    Diagnosis  POA    **Altered mental status [R41.82]  Yes    Decubitus ulcer [L89.90]  Unknown    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       68 y.o. male admitted with Altered mental status.    Altered mental status  - AMS from admission appears to be almost entirely resolved, he is able to tell me he is in [facility], that today is Halloween, he initially states the year as 2020 but corrects himself  - AMS could be multifactorial from sacral osteo vs polypharmacy vs other  - pt mentation remains improved    Decubitus ulcers  Sacral osteomyelitis  - Wound care RN has evaluated and rec general surgery consult to evaluate for possible surgical intervention of wounds  - general surgery did not find any wounds requiring debridement- continue local wound care  - ID following/managing abx for sacral osteomyelitis     Anemia, normocytic  - Hgb 9.2, MCV 86  - no historical labs  - check iron profile, b12, folate- pending    Elevated troponin  - stable/flat at 38 x2  - EKG without ischemic changes    Lovenox 40 mg SC daily for DVT prophylaxis.  Full code.  Discussed with nursing staff.  Anticipate discharge to SNU facility timing yet to be determined.      Eboni Martin MD  [facility]  11/01/23  10:58 EDT    I wore protective equipment throughout this patient encounter including gloves.  Hand hygiene was performed before donning protective equipment and after removal when leaving the room.    Expected Discharge Date and Time       Expected Discharge Date Expected Discharge Time    Nov 3, 2023               Copied text in this note has been reviewed and is accurate as of 11/01/23.

## 2023-11-01 NOTE — CONSULTS
Cc: Sacral decubitus ulcers    History of presenting illness:   This is a 68-year-old bedbound gentleman with a history of what I believe is a spinal cord injury.  He was admitted with altered mental status.  He was evaluated and noted to have the sacral decubitus ulcers which I believe have been present for some time we are asked to evaluate for consideration of debridement.    Past Medical History: Unclear, seems to have a history of DVT but I do not see this clearly documented.  Hypercholesterolemia, spinal cord injury.    Past Surgical History: Unclear    Medications: Noted to include Eliquis, baclofen, Lasix, albuterol, atorvastatin, tizanidine, sertraline, gabapentin    Allergies: None known    Social History: Nursing home patient    Family History: Negative for known colon or rectal cancer    Review of Systems:  Constitutional: Denies fever or chills  Neck: no swollen glands or dysphagia or odynophagia  Respiratory: negative for SOB, cough, hemoptysis or wheezing  Cardiovascular: negative for chest pain, palpitations or peripheral edema  Gastrointestinal: Denies nausea, vomiting, abdominal pain      Physical Exam:  BMI: 26.6  Temperature 99.0 heart rate 75 blood pressure 92/79  General: alert and oriented, appropriate, no acute distress  Eyes: No scleral icterus, extraocular movements are intact  Neck: Supple without lymphadenopathy or thyromegaly, trachea is in the midline  Respiratory: There is good bilateral chest expansion, no use of accessory muscles is noted  Cardiovascular: No jugular venous distention or peripheral edema is seen  Gastrointestinal: Soft and benign without mass or hernia  Skin: On the buttock and ischium there are small chronic appearing sacral decubitus wounds.  There is some stool in the region.  The base of the wounds appears clean.  I do not see any necrotic debris.  There is no purulent drainage at this time.    Laboratory data: White blood cell count 11.4 hemoglobin 9.2 creatinine  0.6    Imaging data: CT of the pelvis reviewed.  There are chronic changes of the sacrum      Assessment and plan:   -Sacral decubitus ulcers  -I do not see anything that would benefit from debridement at this time  -Continue with wound care per wound nurse recommendations with Dakin's dressings  -We will see as needed      Pako Quiroz MD, FACS  General, Minimally Invasive and Endoscopic Surgery  [facility]    [address] [place] [address], [address]  [place], [place], [address]  P: [phone]  F: [phone]

## 2023-11-02 ENCOUNTER — APPOINTMENT (OUTPATIENT)
Dept: CARDIOLOGY | Facility: HOSPITAL | Age: 68
DRG: 539 | End: 2023-11-02
Payer: MEDICARE

## 2023-11-02 LAB
ANION GAP SERPL CALCULATED.3IONS-SCNC: 5.1 MMOL/L (ref 5–15)
BACTERIA SPEC AEROBE CULT: ABNORMAL
BASOPHILS # BLD AUTO: 0.08 10*3/MM3 (ref 0–0.2)
BASOPHILS NFR BLD AUTO: 0.9 % (ref 0–1.5)
BH CV ECHO MEAS - ACS: 2.26 CM
BH CV ECHO MEAS - AI P1/2T: 1043 MSEC
BH CV ECHO MEAS - AO MAX PG: 10.8 MMHG
BH CV ECHO MEAS - AO MEAN PG: 5.7 MMHG
BH CV ECHO MEAS - AO ROOT DIAM: 3.7 CM
BH CV ECHO MEAS - AO V2 MAX: 164.2 CM/SEC
BH CV ECHO MEAS - AO V2 VTI: 35 CM
BH CV ECHO MEAS - AVA(I,D): 1.62 CM2
BH CV ECHO MEAS - EDV(CUBED): 53.4 ML
BH CV ECHO MEAS - EDV(MOD-SP2): 113 ML
BH CV ECHO MEAS - EDV(MOD-SP4): 127 ML
BH CV ECHO MEAS - EF(MOD-BP): 58.8 %
BH CV ECHO MEAS - EF(MOD-SP2): 59.3 %
BH CV ECHO MEAS - EF(MOD-SP4): 60.6 %
BH CV ECHO MEAS - ESV(CUBED): 22.6 ML
BH CV ECHO MEAS - ESV(MOD-SP2): 46 ML
BH CV ECHO MEAS - ESV(MOD-SP4): 50 ML
BH CV ECHO MEAS - FS: 24.9 %
BH CV ECHO MEAS - IVS/LVPW: 1.07 CM
BH CV ECHO MEAS - IVSD: 0.91 CM
BH CV ECHO MEAS - LAT PEAK E' VEL: 11.6 CM/SEC
BH CV ECHO MEAS - LV MASS(C)D: 96.5 GRAMS
BH CV ECHO MEAS - LV MAX PG: 4.3 MMHG
BH CV ECHO MEAS - LV MEAN PG: 1.92 MMHG
BH CV ECHO MEAS - LV V1 MAX: 103.4 CM/SEC
BH CV ECHO MEAS - LV V1 VTI: 18.6 CM
BH CV ECHO MEAS - LVIDD: 3.8 CM
BH CV ECHO MEAS - LVIDS: 2.8 CM
BH CV ECHO MEAS - LVOT AREA: 3 CM2
BH CV ECHO MEAS - LVOT DIAM: 1.97 CM
BH CV ECHO MEAS - LVPWD: 0.85 CM
BH CV ECHO MEAS - MED PEAK E' VEL: 8.8 CM/SEC
BH CV ECHO MEAS - MV A DUR: 0.15 SEC
BH CV ECHO MEAS - MV A MAX VEL: 55.2 CM/SEC
BH CV ECHO MEAS - MV DEC SLOPE: 520.9 CM/SEC2
BH CV ECHO MEAS - MV DEC TIME: 0.21 SEC
BH CV ECHO MEAS - MV E MAX VEL: 71.8 CM/SEC
BH CV ECHO MEAS - MV E/A: 1.3
BH CV ECHO MEAS - MV MAX PG: 3.9 MMHG
BH CV ECHO MEAS - MV MEAN PG: 1.5 MMHG
BH CV ECHO MEAS - MV P1/2T: 56.7 MSEC
BH CV ECHO MEAS - MV V2 VTI: 25 CM
BH CV ECHO MEAS - MVA(P1/2T): 3.9 CM2
BH CV ECHO MEAS - MVA(VTI): 2.27 CM2
BH CV ECHO MEAS - PA ACC TIME: 0.15 SEC
BH CV ECHO MEAS - PA V2 MAX: 107.2 CM/SEC
BH CV ECHO MEAS - PULM A REVS DUR: 0.1 SEC
BH CV ECHO MEAS - PULM A REVS VEL: 20.2 CM/SEC
BH CV ECHO MEAS - PULM DIAS VEL: 38.4 CM/SEC
BH CV ECHO MEAS - PULM S/D: 1.04
BH CV ECHO MEAS - PULM SYS VEL: 40 CM/SEC
BH CV ECHO MEAS - RV MAX PG: 2.05 MMHG
BH CV ECHO MEAS - RV V1 MAX: 71.6 CM/SEC
BH CV ECHO MEAS - RV V1 VTI: 16.6 CM
BH CV ECHO MEAS - SV(LVOT): 56.7 ML
BH CV ECHO MEAS - SV(MOD-SP2): 67 ML
BH CV ECHO MEAS - SV(MOD-SP4): 77 ML
BH CV ECHO MEAS - TAPSE (>1.6): 2.08 CM
BH CV ECHO MEASUREMENTS AVERAGE E/E' RATIO: 7.04
BH CV XLRA - RV BASE: 3.6 CM
BH CV XLRA - RV LENGTH: 6.7 CM
BH CV XLRA - RV MID: 2.9 CM
BH CV XLRA - TDI S': 12.4 CM/SEC
BUN SERPL-MCNC: 8 MG/DL (ref 8–23)
BUN/CREAT SERPL: 8.3 (ref 7–25)
CALCIUM SPEC-SCNC: 8.8 MG/DL (ref 8.6–10.5)
CHLORIDE SERPL-SCNC: 111 MMOL/L (ref 98–107)
CO2 SERPL-SCNC: 28.9 MMOL/L (ref 22–29)
CREAT SERPL-MCNC: 0.96 MG/DL (ref 0.76–1.27)
DEPRECATED RDW RBC AUTO: 45.2 FL (ref 37–54)
EGFRCR SERPLBLD CKD-EPI 2021: 86.6 ML/MIN/1.73
EOSINOPHIL # BLD AUTO: 0.34 10*3/MM3 (ref 0–0.4)
EOSINOPHIL NFR BLD AUTO: 3.7 % (ref 0.3–6.2)
ERYTHROCYTE [DISTWIDTH] IN BLOOD BY AUTOMATED COUNT: 14.1 % (ref 12.3–15.4)
FERRITIN SERPL-MCNC: 102 NG/ML (ref 30–400)
FOLATE SERPL-MCNC: >20 NG/ML (ref 4.78–24.2)
GLUCOSE SERPL-MCNC: 128 MG/DL (ref 65–99)
GRAM STN SPEC: ABNORMAL
HCT VFR BLD AUTO: 28.4 % (ref 37.5–51)
HGB BLD-MCNC: 8.8 G/DL (ref 13–17.7)
IMM GRANULOCYTES # BLD AUTO: 0.05 10*3/MM3 (ref 0–0.05)
IMM GRANULOCYTES NFR BLD AUTO: 0.5 % (ref 0–0.5)
IRON 24H UR-MRATE: 23 MCG/DL (ref 59–158)
IRON SATN MFR SERPL: 12 % (ref 20–50)
LEFT ATRIUM VOLUME INDEX: 23.1 ML/M2
LYMPHOCYTES # BLD AUTO: 2.25 10*3/MM3 (ref 0.7–3.1)
LYMPHOCYTES NFR BLD AUTO: 24.6 % (ref 19.6–45.3)
MAGNESIUM SERPL-MCNC: 2.4 MG/DL (ref 1.6–2.4)
MCH RBC QN AUTO: 27.4 PG (ref 26.6–33)
MCHC RBC AUTO-ENTMCNC: 31 G/DL (ref 31.5–35.7)
MCV RBC AUTO: 88.5 FL (ref 79–97)
MONOCYTES # BLD AUTO: 0.69 10*3/MM3 (ref 0.1–0.9)
MONOCYTES NFR BLD AUTO: 7.5 % (ref 5–12)
NEUTROPHILS NFR BLD AUTO: 5.74 10*3/MM3 (ref 1.7–7)
NEUTROPHILS NFR BLD AUTO: 62.8 % (ref 42.7–76)
NRBC BLD AUTO-RTO: 0 /100 WBC (ref 0–0.2)
PHOSPHATE SERPL-MCNC: 4 MG/DL (ref 2.5–4.5)
PLATELET # BLD AUTO: 370 10*3/MM3 (ref 140–450)
PMV BLD AUTO: 9.4 FL (ref 6–12)
POTASSIUM SERPL-SCNC: 4.3 MMOL/L (ref 3.5–5.2)
RBC # BLD AUTO: 3.21 10*6/MM3 (ref 4.14–5.8)
SINUS: 3.4 CM
SODIUM SERPL-SCNC: 145 MMOL/L (ref 136–145)
STJ: 3 CM
TIBC SERPL-MCNC: 198 MCG/DL (ref 298–536)
TRANSFERRIN SERPL-MCNC: 133 MG/DL (ref 200–360)
VIT B12 BLD-MCNC: 725 PG/ML (ref 211–946)
WBC NRBC COR # BLD: 9.15 10*3/MM3 (ref 3.4–10.8)

## 2023-11-02 PROCEDURE — 99232 SBSQ HOSP IP/OBS MODERATE 35: CPT | Performed by: INTERNAL MEDICINE

## 2023-11-02 PROCEDURE — 82746 ASSAY OF FOLIC ACID SERUM: CPT | Performed by: STUDENT IN AN ORGANIZED HEALTH CARE EDUCATION/TRAINING PROGRAM

## 2023-11-02 PROCEDURE — 84100 ASSAY OF PHOSPHORUS: CPT | Performed by: STUDENT IN AN ORGANIZED HEALTH CARE EDUCATION/TRAINING PROGRAM

## 2023-11-02 PROCEDURE — 92610 EVALUATE SWALLOWING FUNCTION: CPT

## 2023-11-02 PROCEDURE — 93306 TTE W/DOPPLER COMPLETE: CPT | Performed by: INTERNAL MEDICINE

## 2023-11-02 PROCEDURE — 83540 ASSAY OF IRON: CPT | Performed by: STUDENT IN AN ORGANIZED HEALTH CARE EDUCATION/TRAINING PROGRAM

## 2023-11-02 PROCEDURE — 25010000002 VANCOMYCIN PER 500 MG: Performed by: INTERNAL MEDICINE

## 2023-11-02 PROCEDURE — 82728 ASSAY OF FERRITIN: CPT | Performed by: STUDENT IN AN ORGANIZED HEALTH CARE EDUCATION/TRAINING PROGRAM

## 2023-11-02 PROCEDURE — 25810000003 SODIUM CHLORIDE 0.9 % SOLUTION: Performed by: INTERNAL MEDICINE

## 2023-11-02 PROCEDURE — 25010000002 PIPERACILLIN SOD-TAZOBACTAM PER 1 G: Performed by: INTERNAL MEDICINE

## 2023-11-02 PROCEDURE — 84466 ASSAY OF TRANSFERRIN: CPT | Performed by: STUDENT IN AN ORGANIZED HEALTH CARE EDUCATION/TRAINING PROGRAM

## 2023-11-02 PROCEDURE — 80048 BASIC METABOLIC PNL TOTAL CA: CPT | Performed by: STUDENT IN AN ORGANIZED HEALTH CARE EDUCATION/TRAINING PROGRAM

## 2023-11-02 PROCEDURE — 93306 TTE W/DOPPLER COMPLETE: CPT

## 2023-11-02 PROCEDURE — 82607 VITAMIN B-12: CPT | Performed by: STUDENT IN AN ORGANIZED HEALTH CARE EDUCATION/TRAINING PROGRAM

## 2023-11-02 PROCEDURE — 85025 COMPLETE CBC W/AUTO DIFF WBC: CPT | Performed by: STUDENT IN AN ORGANIZED HEALTH CARE EDUCATION/TRAINING PROGRAM

## 2023-11-02 PROCEDURE — 83735 ASSAY OF MAGNESIUM: CPT | Performed by: STUDENT IN AN ORGANIZED HEALTH CARE EDUCATION/TRAINING PROGRAM

## 2023-11-02 PROCEDURE — 25510000001 PERFLUTREN (DEFINITY) 8.476 MG IN SODIUM CHLORIDE (PF) 0.9 % 10 ML INJECTION: Performed by: INTERNAL MEDICINE

## 2023-11-02 RX ORDER — GABAPENTIN 100 MG/1
200 CAPSULE ORAL 3 TIMES DAILY
Status: DISCONTINUED | OUTPATIENT
Start: 2023-11-02 | End: 2023-11-06 | Stop reason: HOSPADM

## 2023-11-02 RX ADMIN — GABAPENTIN 200 MG: 100 CAPSULE ORAL at 20:53

## 2023-11-02 RX ADMIN — BACLOFEN 20 MG: 10 TABLET ORAL at 20:53

## 2023-11-02 RX ADMIN — MULTIPLE VITAMINS W/ MINERALS TAB 1 TABLET: TAB at 08:18

## 2023-11-02 RX ADMIN — SERTRALINE 50 MG: 50 TABLET, FILM COATED ORAL at 08:19

## 2023-11-02 RX ADMIN — FUROSEMIDE 20 MG: 20 TABLET ORAL at 08:18

## 2023-11-02 RX ADMIN — SENNOSIDES 2 TABLET: 8.6 TABLET, FILM COATED ORAL at 20:53

## 2023-11-02 RX ADMIN — DAKIN'S SOLUTION 0.125% (QUARTER STRENGTH): 0.12 SOLUTION at 17:04

## 2023-11-02 RX ADMIN — BACLOFEN 20 MG: 10 TABLET ORAL at 16:16

## 2023-11-02 RX ADMIN — OXYCODONE HYDROCHLORIDE AND ACETAMINOPHEN 1000 MG: 500 TABLET ORAL at 08:18

## 2023-11-02 RX ADMIN — SENNOSIDES 2 TABLET: 8.6 TABLET, FILM COATED ORAL at 08:19

## 2023-11-02 RX ADMIN — ATORVASTATIN CALCIUM 10 MG: 20 TABLET, FILM COATED ORAL at 20:53

## 2023-11-02 RX ADMIN — DAKIN'S SOLUTION 0.125% (QUARTER STRENGTH): 0.12 SOLUTION at 06:42

## 2023-11-02 RX ADMIN — GABAPENTIN 200 MG: 100 CAPSULE ORAL at 16:16

## 2023-11-02 RX ADMIN — SODIUM CHLORIDE 75 ML/HR: 9 INJECTION, SOLUTION INTRAVENOUS at 06:41

## 2023-11-02 RX ADMIN — PIPERACILLIN SODIUM AND TAZOBACTAM SODIUM 3.38 G: 3; .375 INJECTION, SOLUTION INTRAVENOUS at 00:34

## 2023-11-02 RX ADMIN — DOCUSATE SODIUM 100 MG: 100 CAPSULE, LIQUID FILLED ORAL at 08:18

## 2023-11-02 RX ADMIN — VANCOMYCIN HYDROCHLORIDE 1000 MG: 1 INJECTION, SOLUTION INTRAVENOUS at 06:41

## 2023-11-02 RX ADMIN — FERROUS SULFATE TAB 325 MG (65 MG ELEMENTAL FE) 325 MG: 325 (65 FE) TAB at 08:18

## 2023-11-02 RX ADMIN — Medication 100 MG: at 08:20

## 2023-11-02 RX ADMIN — PIPERACILLIN SODIUM AND TAZOBACTAM SODIUM 3.38 G: 3; .375 INJECTION, SOLUTION INTRAVENOUS at 16:16

## 2023-11-02 RX ADMIN — APIXABAN 2.5 MG: 2.5 TABLET, FILM COATED ORAL at 20:53

## 2023-11-02 RX ADMIN — PERFLUTREN 2 ML: 6.52 INJECTION, SUSPENSION INTRAVENOUS at 09:16

## 2023-11-02 RX ADMIN — BACLOFEN 20 MG: 10 TABLET ORAL at 08:19

## 2023-11-02 RX ADMIN — APIXABAN 2.5 MG: 2.5 TABLET, FILM COATED ORAL at 08:18

## 2023-11-02 RX ADMIN — Medication 220 MG: at 08:18

## 2023-11-02 RX ADMIN — PIPERACILLIN SODIUM AND TAZOBACTAM SODIUM 3.38 G: 3; .375 INJECTION, SOLUTION INTRAVENOUS at 08:19

## 2023-11-02 RX ADMIN — GABAPENTIN 300 MG: 300 CAPSULE ORAL at 08:18

## 2023-11-02 RX ADMIN — FOLIC ACID 2 MG: 1 TABLET ORAL at 08:19

## 2023-11-02 RX ADMIN — Medication 2000 UNITS: at 08:18

## 2023-11-02 NOTE — PROGRESS NOTES
"    Patient Name: Misael Sharp  :1955  68 y.o.      Patient Care Team:  Linus Martinez MD as PCP - General (Internal Medicine)    Chief Complaint: MS changes    Interval History: no issues overnight       Objective   Vital Signs  Temp:  [97.7 °F (36.5 °C)-98.3 °F (36.8 °C)] 98.3 °F (36.8 °C)  Resp:  [16] 16  BP: ()/(50-86) 109/71    Intake/Output Summary (Last 24 hours) at 2023 0721  Last data filed at 2023 1657  Gross per 24 hour   Intake 440 ml   Output 1600 ml   Net -1160 ml     Flowsheet Rows      Flowsheet Row First Filed Value   Admission Height 172.7 cm (68\") Documented at 10/30/2023 1819   Admission Weight 79.3 kg (174 lb 12.8 oz) Documented at 10/30/2023 1551            Physical Exam:   General Appearance:    comfortable   Lungs:     Clear to auscultation.  Normal respiratory effort and rate.      Heart:    Regular rhythm and normal rate, normal S1 and S2, no murmurs, gallops or rubs.     Chest Wall:    No abnormalities observed   Abdomen:     Soft, nontender, positive bowel sounds.     Extremities:   no cyanosis, clubbing or edema.  No marked joint deformities.        Results Review:    Results from last 7 days   Lab Units 23  0620   SODIUM mmol/L 141   POTASSIUM mmol/L 3.3*   CHLORIDE mmol/L 107   CO2 mmol/L 26.0   BUN mg/dL 8   CREATININE mg/dL 0.68*   GLUCOSE mg/dL 99   CALCIUM mg/dL 8.9     Results from last 7 days   Lab Units 10/30/23  1731 10/30/23  1520   HSTROP T ng/L 38* 38*     Results from last 7 days   Lab Units 23  0620   WBC 10*3/mm3 8.17   HEMOGLOBIN g/dL 9.2*   HEMATOCRIT % 28.8*   PLATELETS 10*3/mm3 336         Results from last 7 days   Lab Units 23  0620   MAGNESIUM mg/dL 2.6*                   Medication Review:   apixaban, 2.5 mg, Oral, BID  ascorbic acid, 1,000 mg, Oral, Daily  atorvastatin, 10 mg, Oral, Nightly  baclofen, 20 mg, Oral, TID  cholecalciferol, 2,000 Units, Oral, Daily  docusate sodium, 100 mg, Oral, Daily  ferrous sulfate, 325 " mg, Oral, Daily With Breakfast  folic acid, 2 mg, Oral, Daily  furosemide, 20 mg, Oral, Daily  gabapentin, 300 mg, Oral, TID  multivitamin with minerals, 1 tablet, Oral, Daily  piperacillin-tazobactam, 3.375 g, Intravenous, Q8H  senna, 2 tablet, Oral, BID  sertraline, 50 mg, Oral, Daily  sodium hypochlorite, , Topical, Q12H  thiamine, 100 mg, Oral, Daily  vancomycin, 1,000 mg, Intravenous, Q12H  zinc sulfate, 220 mg, Oral, Daily         Pharmacy to dose vancomycin,   sodium chloride, 75 mL/hr, Last Rate: 75 mL/hr (11/02/23 0641)        Assessment & Plan     Active Hospital Problems    Diagnosis  POA    **Altered mental status [R41.82]  Yes    Decubitus ulcer [L89.90]  Unknown    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       1.  Altered mental status, evaluation underway  2.  Ventricular bigeminy, intermittent episodes of bigeminy, patient denies any symptoms but a very poor historian.  No known cardiac history.  We will obtain an echocardiogram.  Troponin is above reference but flat with no delta, no evidence of ACS or myocardial infarction. Echo pending     Jovanni Jackman III, MD  [facility]  11/02/23  07:21 EDT

## 2023-11-02 NOTE — PROGRESS NOTES
Name: Misael Sharp ADMIT: 10/30/2023   : 1955  PCP: Linus Martinez MD    MRN: [id number] LOS: 2 days   AGE/SEX: 68 y.o. male  ROOM: [facility]     Subjective   Subjective   Resting in bed, in no apparent distress. Patient has no complaints at this time. D/w RN. Patient completed echo this morning.        Objective   Objective   Vital Signs  Temp:  [97.5 °F (36.4 °C)-98.3 °F (36.8 °C)] 97.6 °F (36.4 °C)  Heart Rate:  [64-66] 66  Resp:  [16] 16  BP: ()/(50-86) 118/71  SpO2:  [100 %] 100 %  on   ;   Device (Oxygen Therapy): room air  Body mass index is 26.46 kg/m².  Physical Exam  Constitutional:       General: He is not in acute distress.     Appearance: He is ill-appearing. He is not toxic-appearing.   Cardiovascular:      Rate and Rhythm: Normal rate and regular rhythm.   Pulmonary:      Effort: Pulmonary effort is normal. No respiratory distress.      Breath sounds: Normal breath sounds. No wheezing.   Abdominal:      Palpations: Abdomen is soft.      Tenderness: There is no abdominal tenderness.   Musculoskeletal:         General: No tenderness.      Right lower leg: No edema.      Left lower leg: No edema.      Comments: B/l LE atrophied   Skin:     General: Skin is warm and dry.   Neurological:      General: No focal deficit present.      Mental Status: He is alert and oriented to person, place, and time. Mental status is at baseline.      Motor: No weakness.         Results Review     I reviewed the patient's new clinical results.  Results from last 7 days   Lab Units 23  0749 23  0620 10/31/23  0536 10/30/23  1520   WBC 10*3/mm3 9.15 8.17 11.38* 10.05   HEMOGLOBIN g/dL 8.8* 9.2* 9.2* 10.3*   PLATELETS 10*3/mm3 370 336 316 300     Results from last 7 days   Lab Units 23  0749 23  0620 10/31/23  0536 10/30/23  1520   SODIUM mmol/L 145 141 142 141   POTASSIUM mmol/L 4.3 3.3* 3.7 4.2   CHLORIDE mmol/L 111* 107 107 104   CO2 mmol/L 28.9 26.0 25.0 28.3   BUN mg/dL 8 8 10 13  "  CREATININE mg/dL 0.96 0.68* 0.64* 0.69*   GLUCOSE mg/dL 128* 99 102* 96   Estimated Creatinine Clearance: 83.3 mL/min (by C-G formula based on SCr of 0.96 mg/dL).  Results from last 7 days   Lab Units 10/30/23  1520   ALBUMIN g/dL 2.9*   BILIRUBIN mg/dL 0.4   ALK PHOS U/L 126*   AST (SGOT) U/L 28   ALT (SGPT) U/L 18     Results from last 7 days   Lab Units 11/02/23  0749 11/01/23  0620 10/31/23  0536 10/30/23  1520   CALCIUM mg/dL 8.8 8.9 8.8 9.3   ALBUMIN g/dL  --   --   --  2.9*   MAGNESIUM mg/dL 2.4 2.6*  --   --    PHOSPHORUS mg/dL 4.0 3.5  --   --      Results from last 7 days   Lab Units 10/30/23  1520   LACTATE mmol/L 1.0   No results found for: \"COVID19\"  Glucose   Date/Time Value Ref Range Status   10/31/2023 0641 105 70 - 130 mg/dL Final   10/31/2023 0011 116 70 - 130 mg/dL Final       Adult Transthoracic Echo Complete W/ Cont if Necessary Per Protocol    Left ventricular systolic function is normal. Calculated left   ventricular EF = 58.8%    Left ventricular diastolic function was normal.    Scheduled Medications  apixaban, 2.5 mg, Oral, BID  ascorbic acid, 1,000 mg, Oral, Daily  atorvastatin, 10 mg, Oral, Nightly  baclofen, 20 mg, Oral, TID  cholecalciferol, 2,000 Units, Oral, Daily  docusate sodium, 100 mg, Oral, Daily  ferrous sulfate, 325 mg, Oral, Daily With Breakfast  folic acid, 2 mg, Oral, Daily  furosemide, 20 mg, Oral, Daily  gabapentin, 300 mg, Oral, TID  multivitamin with minerals, 1 tablet, Oral, Daily  piperacillin-tazobactam, 3.375 g, Intravenous, Q8H  senna, 2 tablet, Oral, BID  sertraline, 50 mg, Oral, Daily  sodium hypochlorite, , Topical, Q12H  thiamine, 100 mg, Oral, Daily  [START ON 11/3/2023] vancomycin, 1,250 mg, Intravenous, Q24H  zinc sulfate, 220 mg, Oral, Daily    Infusions  Pharmacy to dose vancomycin,   sodium chloride, 75 mL/hr, Last Rate: 75 mL/hr (11/02/23 0641)    Diet  Diet: Cardiac Diets; Healthy Heart (2-3 Na+); Texture: Regular Texture (IDDSI 7); Fluid Consistency: " Thin (IDDSI 0)         I have personally reviewed:  [x]  Laboratory   []  Microbiology   [x]  Radiology   [x]  EKG/Telemetry   []  Cardiology/Vascular   []  Pathology   []  Records    Assessment/Plan     Active Hospital Problems    Diagnosis  POA    **Altered mental status [R41.82]  Yes    Decubitus ulcer [L89.90]  Unknown    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       68 y.o. male admitted with Altered mental status.    Altered mental status  - AMS from admission appears to be almost entirely resolved, he is able to tell me he is in [facility], that today is Halloween, he initially states the year as 2020 but corrects himself  - AMS could be multifactorial from sacral osteo vs polypharmacy vs other  - pt mentation remains improved    Decubitus ulcers  Sacral osteomyelitis  - Wound care RN has evaluated and rec general surgery consult to evaluate for possible surgical intervention of wounds  - general surgery did not find any wounds requiring debridement- continue local wound care  - ID following/managing abx for sacral osteomyelitis; likely will treat with short course of abx for SSI     Ventricular bigeminy  - no records available, cards consulted  - echo with normal systolic and diastolic fcn; no further w/u per cardiology    Anemia, normocytic  - Hgb 9.2, MCV 86  - no historical labs  - normal B12/folate, labs consistent with anemia of chronic dz with low iron, low TIBC    Elevated troponin  - stable/flat at 38 x2  - EKG without ischemic changes    Lovenox 40 mg SC daily for DVT prophylaxis.  Full code.  Discussed with nursing staff.  Anticipate discharge to SNU facility when cleared by consultants.      Eboni Martin MD  [facility]  11/02/23  15:17 EDT    I wore protective equipment throughout this patient encounter including gloves.  Hand  hygiene was performed before donning protective equipment and after removal when leaving the room.    Expected Discharge Date and Time       Expected Discharge Date Expected Discharge Time    Nov 3, 2023              Copied text in this note has been reviewed and is accurate as of 11/02/23.

## 2023-11-02 NOTE — PROGRESS NOTES
"[facility] Clinical Pharmacy Services: Vancomycin Monitoring Note    Misael Sharp is a 68 y.o. male who is on day 4/5 of pharmacy to dose vancomycin for Skin and Soft Tissue.    Previous Vancomycin Dose: 1000 mg every 12 hours   Updated Cultures and Sensitivities:   -10/30 Wound, foot:  proteus mirabilis, Strep agalactiae  -10/30 Wound, sacral: Proteus mirabilis, E.coli  Results from last 7 days   Lab Units 11/01/23  0620   VANCOMYCIN TR mcg/mL 17.70     Vitals/Labs  Ht: 172.7 cm (68\"); Wt: 79.3 kg (174 lb 12.8 oz)   Temp Readings from Last 1 Encounters:   11/02/23 98.3 °F (36.8 °C) (Axillary)     Estimated Creatinine Clearance: 83.8 mL/min (by C-G formula based on SCr of 0.96 mg/dL).       Results from last 7 days   Lab Units 11/02/23  0749 11/01/23  0620 10/31/23  0536   CREATININE mg/dL 0.96 0.68* 0.64*   WBC 10*3/mm3 9.15 8.17 11.38*     Assessment/Plan  Acute increase in Scr. The current regimen is now predicted to be supratherapeutic.     Current Vancomycin Dose: change regimen to vancomycin 1250 mg every 24 hours, which is predicted to achieve an AUC of 502 mg/L*hr. Additionally a Q24h regimen is more convenient should long term IV vancomycin be indicated.  Next Level Date and Time: 11/4 @0630  We will continue to monitor patient changes and renal function     Thank you for involving pharmacy in this patient's care. Please contact pharmacy with any questions or concerns.       Cheyenne Gowers, [facility]  Clinical Pharmacist    "

## 2023-11-02 NOTE — PLAN OF CARE
Goal Outcome Evaluation:  Plan of Care Reviewed With: patient           Outcome Evaluation: Recommend regular solids/thin liquids. Meds whole with thins or in puree as tolerated. Recommend implementation of the following compensatory strategies: PO only when alert. Upright posture during/after eating, alternating bites/sips, small bites/sips, general aspiration precautions, feeding assist with cueing for implementation of bolus size and rate modification.

## 2023-11-02 NOTE — PROGRESS NOTES
Echo reviewed:  Results for orders placed during the hospital encounter of 10/30/23    Adult Transthoracic Echo Complete W/ Cont if Necessary Per Protocol    Interpretation Summary    Left ventricular systolic function is normal. Calculated left ventricular EF = 58.8%    Left ventricular diastolic function was normal.    No active cardiac issues, will sign off, call if we can help in any way

## 2023-11-02 NOTE — PROGRESS NOTES
"Nutrition Services    Patient Name:  Misael Sharp  YOB: 1955  MRN: [id number]  Admit Date:  10/30/2023    Assessment Date:  11/02/23    Summary: Nutrition follow up. A bit more lethargic today, re-eval by SLP noted.  OK for po when awake, no change in diet texture.  RN states he drinks the Boost well, not sure about the Leo. % of meals. Labs, meds, skin reviewed, DC planning - likely back to SNU. Last BM 10/31.    Plan/Recommendations:  Continue to encourage and assist with po.    Will follow clinical course, nutrition needs.    CLINICAL NUTRITION ASSESSMENT      Reason for Assessment Follow-up Protocol     Diagnosis/Problem   AMS, Anemia, decubitus ulcers, Osteomyelitis sacrum    Medical/Surgical History History reviewed. No pertinent past medical history.    History reviewed. No pertinent surgical history.     Anthropometrics        Current Height  Current Weight  BMI kg/m2 Height: 172.7 cm (68\")  Weight: 78.9 kg (174 lb) (11/02/23 0916)  Body mass index is 26.46 kg/m².   Adjusted BMI (if applicable)    BMI Category Overweight (25 - 29.9)   Ideal Body Weight (IBW) 68.4kg   Usual Body Weight (UBW) unknown   Weight Trend Unknown   Weight History Wt Readings from Last 30 Encounters:   11/02/23 0916 78.9 kg (174 lb)   10/30/23 1551 79.3 kg (174 lb 12.8 oz)        Estimated/Assessed Needs        Current Weight  Weight: 78.9 kg (174 lb) (11/02/23 0916)       Energy Requirements    Weight for Calculation 79 kg   Method for Estimation  25 kcal/kg   EST Needs (kcal/day) 1975       Protein Requirements    Weight for Calculation 79 kg   EST Protein Needs (g/kg) 1.2 gm/kg   EST Daily Needs (g/day) 95       Fluid Requirements     Method for Estimation 1 mL/kcal    EST Needs (mL/day)      Labs       Pertinent Labs    Results from last 7 days   Lab Units 11/02/23  0749 11/01/23  0620 10/31/23  0536 10/30/23  1520   SODIUM mmol/L 145 141 142 141   POTASSIUM mmol/L 4.3 3.3* 3.7 4.2   CHLORIDE mmol/L 111* " "107 107 104   CO2 mmol/L 28.9 26.0 25.0 28.3   BUN mg/dL 8 8 10 13   CREATININE mg/dL 0.96 0.68* 0.64* 0.69*   CALCIUM mg/dL 8.8 8.9 8.8 9.3   BILIRUBIN mg/dL  --   --   --  0.4   ALK PHOS U/L  --   --   --  126*   ALT (SGPT) U/L  --   --   --  18   AST (SGOT) U/L  --   --   --  28   GLUCOSE mg/dL 128* 99 102* 96     Results from last 7 days   Lab Units 11/02/23  0749 11/01/23  0620 10/31/23  0536 10/30/23  1520   MAGNESIUM mg/dL 2.4 2.6*  --   --    PHOSPHORUS mg/dL 4.0 3.5   < >  --    HEMOGLOBIN g/dL 8.8* 9.2*   < > 10.3*   HEMATOCRIT % 28.4* 28.8*   < > 32.5*   WBC 10*3/mm3 9.15 8.17   < > 10.05   ALBUMIN g/dL  --   --   --  2.9*    < > = values in this interval not displayed.     Results from last 7 days   Lab Units 11/02/23  0749 11/01/23  0620 10/31/23  0536 10/30/23  1520   PLATELETS 10*3/mm3 370 336 316 300     No results found for: \"COVID19\"  No results found for: \"HGBA1C\"       Medications           Scheduled Medications apixaban, 2.5 mg, Oral, BID  ascorbic acid, 1,000 mg, Oral, Daily  atorvastatin, 10 mg, Oral, Nightly  baclofen, 20 mg, Oral, TID  cholecalciferol, 2,000 Units, Oral, Daily  docusate sodium, 100 mg, Oral, Daily  ferrous sulfate, 325 mg, Oral, Daily With Breakfast  folic acid, 2 mg, Oral, Daily  furosemide, 20 mg, Oral, Daily  gabapentin, 300 mg, Oral, TID  multivitamin with minerals, 1 tablet, Oral, Daily  piperacillin-tazobactam, 3.375 g, Intravenous, Q8H  senna, 2 tablet, Oral, BID  sertraline, 50 mg, Oral, Daily  sodium hypochlorite, , Topical, Q12H  thiamine, 100 mg, Oral, Daily  [START ON 11/3/2023] vancomycin, 1,250 mg, Intravenous, Q24H  zinc sulfate, 220 mg, Oral, Daily       Infusions Pharmacy to dose vancomycin,   sodium chloride, 75 mL/hr, Last Rate: 75 mL/hr (11/02/23 0641)       PRN Medications   acetaminophen    [DISCONTINUED] senna-docusate sodium **AND** polyethylene glycol **AND** bisacodyl **AND** bisacodyl    HYDROcodone-acetaminophen    ipratropium-albuterol    " melatonin    ondansetron **OR** ondansetron    Pharmacy to dose vancomycin    [COMPLETED] Insert Peripheral IV **AND** sodium chloride    tiZANidine     Physical Findings          General Findings alert, disoriented   Oral/Mouth Cavity tooth or teeth missing   Edema  not assessed   Gastrointestinal last bowel movement: 10/31   Skin  pressure injury: Coccyx,gluteal, heel   Tubes/Drains/Lines none   NFPE No clinical signs of muscle wasting or fat loss   --  Current Nutrition Orders & Evaluation of Intake       Oral Nutrition     Food Allergies NKFA   Current PO Diet Diet: Cardiac Diets; Healthy Heart (2-3 Na+); Texture: Regular Texture (IDDSI 7); Fluid Consistency: Thin (IDDSI 0)   Supplement Boost Plus, Leo, BID   PO Evaluation     % PO Intake %    Factors Affecting Intake: altered mental status, unable to feed self   --  PES STATEMENT / NUTRITION DIAGNOSIS      Nutrition Dx Problem  Problem: Increased Nutrient Needs  Etiology: Medical Diagnosis - multiple pressure injuries    Signs/Symptoms: Report/Observation     NUTRITION INTERVENTION / PLAN OF CARE      Intervention Goal(s) Maintain nutrition status, Meet estimated needs, Disease management/therapy, Tolerate PO , Increase intake, and No significant weight loss         RD Intervention/Action Continue to monitor and Care plan reviewed   --      Prescription/Orders:       PO Diet       Supplements       Enteral Nutrition       Parenteral Nutrition    New Prescription Ordered? No changes at this time   --      Monitor/Evaluation Per protocol, I&O, PO intake, Supplement intake, Weight, Skin status, GI status, Symptoms, POC/GOC, Swallow function, Hemodynamic stability   Discharge Plan/Needs Pending clinical course   --    RD to follow per protocol.      Electronically signed by:  Ann Lawson RD  11/02/23 15:09 EDT

## 2023-11-02 NOTE — THERAPY EVALUATION
Acute Care - Speech Language Pathology   Swallow Initial Evaluation [facility]     Patient Name: Misael Sharp  : 1955  MRN: [id number]  Today's Date: 2023               Admit Date: 10/30/2023    Visit Dx:     ICD-10-CM ICD-9-CM   1. Altered mental status, unspecified altered mental status type  R41.82 780.97   2. Pressure injury of skin, unspecified injury stage, unspecified location  L89.90 707.00     707.20   3. Urinary tract infection associated with indwelling urethral catheter, initial encounter  T83.511A 996.64    N39.0 599.0     Patient Active Problem List   Diagnosis    Altered mental status    Decubitus ulcer    Sacral osteomyelitis    Anemia    Elevated troponin     History reviewed. No pertinent past medical history.  History reviewed. No pertinent surgical history.    SLP Recommendation and Plan     SLP Diet Recommendation: regular textures, thin liquids (23)  Recommended Precautions and Strategies: upright posture during/after eating, small bites of food and sips of liquid, alternate between small bites of food and sips of liquid, general aspiration precautions, 1:1 supervision, assist with feeding (23)  SLP Rec. for Method of Medication Administration: meds whole, with thin liquids, with puree, as tolerated (23)     Monitor for Signs of Aspiration: notify SLP if any concerns (23)        Anticipated Discharge Disposition (SLP): skilled nursing facility (23)     Therapy Frequency (Swallow): evaluation only (23)     Oral Care Recommendations: Oral Care BID/PRN (23)                                      Oral Care Recommendations: Oral Care BID/PRN (23)    Plan of Care Reviewed With: patient  Outcome Evaluation: Recommend regular solids/thin liquids. Meds whole with thins or in puree as tolerated. Recommend implementation of the following compensatory strategies: PO only when alert. Upright posture  during/after eating, alternating bites/sips, small bites/sips, general aspiration precautions, feeding assist with cueing for implementation of bolus size and rate modification.      SWALLOW EVALUATION (last 72 hours)       SLP Adult Swallow Evaluation       Row Name 11/02/23 1200                   Rehab Evaluation    Document Type evaluation  -        Subjective Information no complaints  -        Patient Observations alert;cooperative;agree to therapy  -        Patient Effort good  -        Symptoms Noted During/After Treatment none  -           General Information    Patient Profile Reviewed yes  -        Pertinent History Of Current Problem Patient is a 69 y/o male who was admitted with altered mental status, anemia, decubitus ulcers, and ostemyelitis sacrum. The patient resides at [facility].  -        Current Method of Nutrition regular textures;thin liquids  -        Precautions/Limitations, Vision WFL;for purposes of eval  -        Precautions/Limitations, Hearing WFL;for purposes of eval  -        Prior Level of Function-Swallowing regular textures;thin liquids  -        Plans/Goals Discussed with patient;agreed upon  [facility]        Barriers to Rehab cognitive status  -        Patient's Goals for Discharge patient did not state  -           Pain    Additional Documentation Pain Scale: Numbers Pre/Post-Treatment (Group)  -           Pain Scale: Numbers Pre/Post-Treatment    Pretreatment Pain Rating 0/10 - no pain  -        Posttreatment Pain Rating 0/10 - no pain  -           Oral Motor Structure and Function    Dentition Assessment edentulous, does not have dentures  -        Secretion Management WNL/WFL  -        Mucosal Quality moist, healthy  -           Oral Musculature and Cranial Nerve Assessment    Oral Motor General Assessment WFL  -           General Eating/Swallowing Observations    Respiratory Support Currently in Use room air  -           Respiratory     Respiratory Status room air  -           Clinical Swallow Eval    Clinical Swallow Evaluation Summary Patient seen this date for completion of bedside swallow evaluation. Patient alerting to min verbal stim, amenable to evaluation. AO to self and year. HOB elevated prior to PO. Patient with total edentulism, does not have dentures per patient report. OM characterized by lingual and labial strength WFL. Patient presented with ice chips, thins via tsp, thins via cup, thins via straw, puree, and regular solids. Patient with adequate oral acceptance with no anterior loss appreciated across trials. Functional mastication of regular solids.  Complete oral clearance achieved with no pocketing or oral residue appreciated. Wet vocal quality appreciated x1 with initial trial of thins via tsp, cleared with patient initated throat clear. No further s/s of airway threat appreciated across trials.  Belching appreciated after PO, consider GI referral with further concern for esophageal dysphagia/symptoms. Recommend continuation regular solids/thin liquids with meds whole in puree or thin liquids as tolerated. PO only when alert. Upright for all PO and 30 minutes after, alternating bites/sips, small bites/sips, feeding assist for cueing for implementation of bolus size and rate modification.  -           Recommendations    Therapy Frequency (Swallow) evaluation only  -        Predicted Duration Therapy Intervention (Days) --  -        SLP Diet Recommendation regular textures;thin liquids  -        Recommended Precautions and Strategies upright posture during/after eating;small bites of food and sips of liquid;alternate between small bites of food and sips of liquid;general aspiration precautions;1:1 supervision;assist with feeding  -        Oral Care Recommendations Oral Care BID/PRN  -        SLP Rec. for Method of Medication Administration meds whole;with thin liquids;with puree;as tolerated  -        Monitor  for Signs of Aspiration notify SLP if any concerns  -        Anticipated Discharge Disposition (SLP) skilled nursing [facility]  -                  User Key  (r) = Recorded By, (t) = Taken By, (c) = Cosigned By      Initials Name Effective Dates    Lexy Beltrán SLP 07/11/23 -                     EDUCATION  The patient has been educated in the following areas:   Dysphagia (Swallowing Impairment).              Time Calculation:    Time Calculation- SLP       Row Name 11/02/23 1332             Time Calculation- [facility]    SLP Start Time 1200  -                User Key  (r) = Recorded By, (t) = Taken By, (c) = Cosigned By      Initials Name Provider Type     Lexy Ledbetter SLP Speech and Language Pathologist                    Therapy Charges for Today       Code Description Service Date Service Provider Modifiers Qty    [id number] HC ST EVAL ORAL PHARYNG SWALLOW 4 11/2/2023 Lexy Ledbetter SLP GN 1                 CHETNA Cancino  11/2/2023

## 2023-11-02 NOTE — PROGRESS NOTES
"  Infectious Diseases Progress Note    Law Silva MD     [facility]  [place]: 2 days  Patient Identification:  Name: Misael Sharp  Age: 68 y.o.  Sex: male  :  1955  MRN: [id number]         Primary Care Physician: Linus Martinez MD        Subjective: Denies any new complaints.  Denies any fever and chills.  Interval History: See consultation note.    Objective:    Scheduled Meds:apixaban, 2.5 mg, Oral, BID  ascorbic acid, 1,000 mg, Oral, Daily  atorvastatin, 10 mg, Oral, Nightly  baclofen, 20 mg, Oral, TID  cholecalciferol, 2,000 Units, Oral, Daily  docusate sodium, 100 mg, Oral, Daily  ferrous sulfate, 325 mg, Oral, Daily With Breakfast  folic acid, 2 mg, Oral, Daily  furosemide, 20 mg, Oral, Daily  gabapentin, 300 mg, Oral, TID  multivitamin with minerals, 1 tablet, Oral, Daily  piperacillin-tazobactam, 3.375 g, Intravenous, Q8H  senna, 2 tablet, Oral, BID  sertraline, 50 mg, Oral, Daily  sodium hypochlorite, , Topical, Q12H  thiamine, 100 mg, Oral, Daily  vancomycin, 1,000 mg, Intravenous, Q12H  zinc sulfate, 220 mg, Oral, Daily      Continuous Infusions:Pharmacy to dose vancomycin,   sodium chloride, 75 mL/hr, Last Rate: 75 mL/hr (23 0641)        Vital signs in last 24 hours:  Temp:  [97.7 °F (36.5 °C)-98.3 °F (36.8 °C)] 98.3 °F (36.8 °C)  Resp:  [16] 16  BP: ()/(50-86) 109/71    Intake/Output:    Intake/Output Summary (Last 24 hours) at 2023 0823  Last data filed at 2023 1657  Gross per 24 hour   Intake 440 ml   Output 1600 ml   Net -1160 ml       Exam:  /71 (BP Location: Right arm, Patient Position: Lying)   Pulse 75   Temp 98.3 °F (36.8 °C) (Axillary)   Resp 16   Ht 172.7 cm (68\")   Wt 79.3 kg (174 lb 12.8 oz)   SpO2 98%   BMI 26.58 kg/m²   Patient is examined using the personal protective equipment as per guidelines from infection control for this particular patient as enacted.  Hand washing was performed before and after patient interaction.  General " Appearance:    Alert, cooperative, no distress, AAOx3                          Head:    Normocephalic, without obvious abnormality, atraumatic                           Eyes:    PERRL, conjunctivae/corneas clear, EOM's intact, both eyes                         Throat:   Lips, tongue, gums normal; oral mucosa pink and moist                           Neck:   Supple, symmetrical, trachea midline, no JVD                         Lungs:    Clear to auscultation bilaterally, respirations unlabored                 Chest Wall:    No tenderness or deformity                          Heart:  S1-S2 tachycardia                  Abdomen:   Soft nontender                 Extremities: Disuse atrophy.                        Pulses:   Pulses palpable in all extremities                            Skin:                               Neurologic: Neurological deficits due to spinal cord injury and plegia noted.       Data Review:    I reviewed the patient's new clinical results.  Results from last 7 days   Lab Units 11/02/23  0749 11/01/23  0620 10/31/23  0536 10/30/23  1520   WBC 10*3/mm3 9.15 8.17 11.38* 10.05   HEMOGLOBIN g/dL 8.8* 9.2* 9.2* 10.3*   PLATELETS 10*3/mm3 370 336 316 300     Results from last 7 days   Lab Units 11/01/23  0620 10/31/23  0536 10/30/23  1520   SODIUM mmol/L 141 142 141   POTASSIUM mmol/L 3.3* 3.7 4.2   CHLORIDE mmol/L 107 107 104   CO2 mmol/L 26.0 25.0 28.3   BUN mg/dL 8 10 13   CREATININE mg/dL 0.68* 0.64* 0.69*   CALCIUM mg/dL 8.9 8.8 9.3   GLUCOSE mg/dL 99 102* 96     Microbiology Results (last 10 days)       Procedure Component Value - Date/Time    CANDIDA AURIS SCREEN - Swab, Axilla Right, Axilla Left and Groin [[id number]]  (Normal) Collected: 10/31/23 1025    Lab Status: Preliminary result Specimen: Swab from Axilla Right, Axilla Left and Groin Updated: 11/01/23 1101     Candida Auris Screen Culture No Candida auris isolated at 24 hours    Blood Culture - Blood, Arm, Left [[id number]]  (Normal)  Collected: 10/30/23 2101    Lab Status: Preliminary result Specimen: Blood from Arm, Left Updated: 11/01/23 2116     Blood Culture No growth at 2 days    Narrative:      Less than seven (7) mL's of blood was collected.  Insufficient quantity may yield false negative results.    Blood Culture - Blood, Arm, Right [[id number]]  (Normal) Collected: 10/30/23 2054    Lab Status: Preliminary result Specimen: Blood from Arm, Right Updated: 11/01/23 2116     Blood Culture No growth at 2 days    Narrative:      Less than seven (7) mL's of blood was collected.  Insufficient quantity may yield false negative results.    Urine Culture - Urine, Urine, Catheter [[id number]] Collected: 10/30/23 1547    Lab Status: Final result Specimen: Urine, Catheter Updated: 10/31/23 1243     Urine Culture >100,000 CFU/mL Mixed Karlene Isolated    Narrative:      Specimen contains mixed organisms of questionable pathogenicity suggestive of contamination. If symptoms persist, suggest recollection.  Colonization of the urinary tract without infection is common. Treatment is discouraged unless the patient is symptomatic, pregnant, or undergoing an invasive urologic procedure.    Wound Culture - Wound, Foot, Right [[id number]]  (Abnormal) Collected: 10/30/23 1537    Lab Status: Final result Specimen: Wound from Foot, Right Updated: 11/02/23 0729     Wound Culture Rare Proteus mirabilis     Comment: Refer to previous wound culture collected on 10/30/2023 1537 for MICs.           Rare Streptococcus agalactiae (Group B)     Comment:   This organism is considered to be universally susceptible to penicillin.  No further antibiotic testing will be performed. If Clindamycin or Erythromycin is the drug of choice, notify the laboratory within 7 days to request susceptibility testing.         Rare Normal Skin Karlene     Gram Stain Moderate (3+) WBCs per low power field      Few (2+) Gram positive cocci    Narrative:              Wound Culture - Wound, Spine, Sacral  [[id number]]  (Abnormal)  (Susceptibility) Collected: 10/30/23 1537    Lab Status: Preliminary result Specimen: Wound from Spine, Sacral Updated: 11/02/23 0723     Wound Culture Light growth (2+) Proteus mirabilis      Scant growth (1+) Escherichia coli     Gram Stain Rare (1+) WBCs per low power field      Many (4+) Gram positive cocci    Narrative:      Organism under investigation.      Susceptibility        Proteus mirabilis      CHAD      Ampicillin Resistant      Ampicillin + Sulbactam Susceptible      Cefepime Susceptible      Ceftazidime Susceptible      Ceftriaxone Susceptible      Gentamicin Susceptible      Levofloxacin Resistant      Piperacillin + Tazobactam Susceptible      Tetracycline Resistant      Trimethoprim + Sulfamethoxazole Resistant                       Susceptibility        Escherichia coli      CHAD      Ampicillin Susceptible      Ampicillin + Sulbactam Susceptible      Cefepime Susceptible      Ceftazidime Susceptible      Ceftriaxone Susceptible      Gentamicin Susceptible      Levofloxacin Susceptible      Piperacillin + Tazobactam Susceptible      Tetracycline Susceptible      Trimethoprim + Sulfamethoxazole Susceptible                       Susceptibility Comments       Proteus mirabilis    Cefazolin sensitivity will not be reported for Enterobacteriaceae in non-urine isolates. If cefazolin is preferred, please call the microbiology lab to request an E-test.  With the exception of urinary-sourced infections, aminoglycosides should not be used as monotherapy.      Escherichia coli    Cefazolin sensitivity will not be reported for Enterobacteriaceae in non-urine isolates. If cefazolin is preferred, please call the microbiology lab to request an E-test.  With the exception of urinary-sourced infections, aminoglycosides should not be used as monotherapy.               Wound Culture - Wound, Spine, Sacral [[id number]]  (Abnormal) Collected: 10/30/23 1537    Lab Status: Final result Specimen:  Wound from Spine, Sacral Updated: 11/02/23 0726     Wound Culture Rare Proteus mirabilis      Rare Normal Skin Karlene     Gram Stain Rare (1+) WBCs per low power field      Rare (1+) Gram positive cocci    Narrative:      Refer to previous wound culture collected on 10/30/2023 1537 for MICs        Wound Culture - Wound, Spine, Sacral [[id number]]  (Abnormal) Collected: 10/30/23 1537    Lab Status: Final result Specimen: Wound from Spine, Sacral Updated: 11/02/23 0726     Wound Culture Rare Proteus mirabilis      Rare Normal Skin Karlene     Gram Stain Few (2+) WBCs per low power field      No organisms seen    Narrative:      Refer to previous wound culture collected on 10/30/2023 1537 for MICs                Assessment:    Altered mental status    Decubitus ulcer    Sacral osteomyelitis    Anemia    Elevated troponin  1-metabolic encephalopathy due to  2-progressively infected sacrococcygeal/left ischial decubitus ulcer with contiguous focus osteomyelitis with mixed infection culture positive for Proteus and E. coli and gram-positive cocci seen in the Gram stain  3-immobility and decubitus ulceration due to spinal cord injury  4-Limited database  5-neurogenic bladder with indwelling catheter in place  6-multiple skin pressure ulcers at different sites.     Recommendations/Discussions:  See my discussion and recommendations on 10/31/2023  Continue present antibiotic therapy until the cultures are finalized.  Given his situation at the best course of management would be to treat him with short course of antibiotic therapy to alleviate systemic and local signs and symptoms of sepsis with aggressive wound care with understanding that the symptoms will recur.  Providing with prolonged antibiotic therapy for contiguous focus osteomyelitis without addressing the issues resulting in this situation would lead to complications of prolonged antibiotic therapy without any sustained cure and significantly adverse outcome from  treatment activity rather than progression of underlying disease process.  This concept was explained to the patient.  De-escalate and simplify antibiotic therapy in the next 24 to 48 hours when all the cultures are finalized.  Law Silva MD  11/2/2023  08:23 EDT    Parts of this note may be an electronic transcription/translation of spoken language to printed text using the Dragon dictation system.

## 2023-11-03 ENCOUNTER — APPOINTMENT (OUTPATIENT)
Dept: CT IMAGING | Facility: HOSPITAL | Age: 68
DRG: 539 | End: 2023-11-03
Payer: MEDICARE

## 2023-11-03 ENCOUNTER — APPOINTMENT (OUTPATIENT)
Dept: MRI IMAGING | Facility: HOSPITAL | Age: 68
DRG: 539 | End: 2023-11-03
Payer: MEDICARE

## 2023-11-03 LAB
ANION GAP SERPL CALCULATED.3IONS-SCNC: 5.6 MMOL/L (ref 5–15)
BASOPHILS # BLD AUTO: 0.06 10*3/MM3 (ref 0–0.2)
BASOPHILS NFR BLD AUTO: 0.6 % (ref 0–1.5)
BUN SERPL-MCNC: 8 MG/DL (ref 8–23)
BUN/CREAT SERPL: 9.1 (ref 7–25)
CALCIUM SPEC-SCNC: 9.3 MG/DL (ref 8.6–10.5)
CHLORIDE SERPL-SCNC: 109 MMOL/L (ref 98–107)
CO2 SERPL-SCNC: 30.4 MMOL/L (ref 22–29)
CREAT SERPL-MCNC: 0.88 MG/DL (ref 0.76–1.27)
DEPRECATED RDW RBC AUTO: 46.1 FL (ref 37–54)
EGFRCR SERPLBLD CKD-EPI 2021: 94.2 ML/MIN/1.73
EOSINOPHIL # BLD AUTO: 0.41 10*3/MM3 (ref 0–0.4)
EOSINOPHIL NFR BLD AUTO: 3.8 % (ref 0.3–6.2)
ERYTHROCYTE [DISTWIDTH] IN BLOOD BY AUTOMATED COUNT: 14.2 % (ref 12.3–15.4)
GLUCOSE BLDC GLUCOMTR-MCNC: 119 MG/DL (ref 70–130)
GLUCOSE SERPL-MCNC: 104 MG/DL (ref 65–99)
HCT VFR BLD AUTO: 28.5 % (ref 37.5–51)
HGB BLD-MCNC: 8.9 G/DL (ref 13–17.7)
IMM GRANULOCYTES # BLD AUTO: 0.06 10*3/MM3 (ref 0–0.05)
IMM GRANULOCYTES NFR BLD AUTO: 0.6 % (ref 0–0.5)
LYMPHOCYTES # BLD AUTO: 2.32 10*3/MM3 (ref 0.7–3.1)
LYMPHOCYTES NFR BLD AUTO: 21.6 % (ref 19.6–45.3)
MAGNESIUM SERPL-MCNC: 2.6 MG/DL (ref 1.6–2.4)
MCH RBC QN AUTO: 27.9 PG (ref 26.6–33)
MCHC RBC AUTO-ENTMCNC: 31.2 G/DL (ref 31.5–35.7)
MCV RBC AUTO: 89.3 FL (ref 79–97)
MONOCYTES # BLD AUTO: 0.76 10*3/MM3 (ref 0.1–0.9)
MONOCYTES NFR BLD AUTO: 7.1 % (ref 5–12)
NEUTROPHILS NFR BLD AUTO: 66.3 % (ref 42.7–76)
NEUTROPHILS NFR BLD AUTO: 7.12 10*3/MM3 (ref 1.7–7)
NRBC BLD AUTO-RTO: 0 /100 WBC (ref 0–0.2)
PHOSPHATE SERPL-MCNC: 4.4 MG/DL (ref 2.5–4.5)
PLATELET # BLD AUTO: 368 10*3/MM3 (ref 140–450)
PMV BLD AUTO: 9.5 FL (ref 6–12)
POTASSIUM SERPL-SCNC: 4 MMOL/L (ref 3.5–5.2)
RBC # BLD AUTO: 3.19 10*6/MM3 (ref 4.14–5.8)
SODIUM SERPL-SCNC: 145 MMOL/L (ref 136–145)
WBC NRBC COR # BLD: 10.73 10*3/MM3 (ref 3.4–10.8)

## 2023-11-03 PROCEDURE — 85025 COMPLETE CBC W/AUTO DIFF WBC: CPT | Performed by: STUDENT IN AN ORGANIZED HEALTH CARE EDUCATION/TRAINING PROGRAM

## 2023-11-03 PROCEDURE — 99222 1ST HOSP IP/OBS MODERATE 55: CPT | Performed by: STUDENT IN AN ORGANIZED HEALTH CARE EDUCATION/TRAINING PROGRAM

## 2023-11-03 PROCEDURE — 84100 ASSAY OF PHOSPHORUS: CPT | Performed by: STUDENT IN AN ORGANIZED HEALTH CARE EDUCATION/TRAINING PROGRAM

## 2023-11-03 PROCEDURE — 94799 UNLISTED PULMONARY SVC/PX: CPT

## 2023-11-03 PROCEDURE — 25010000002 PIPERACILLIN SOD-TAZOBACTAM PER 1 G: Performed by: INTERNAL MEDICINE

## 2023-11-03 PROCEDURE — 25010000002 VANCOMYCIN 10 G RECONSTITUTED SOLUTION: Performed by: INTERNAL MEDICINE

## 2023-11-03 PROCEDURE — 25810000003 SODIUM CHLORIDE 0.9 % SOLUTION: Performed by: INTERNAL MEDICINE

## 2023-11-03 PROCEDURE — 80048 BASIC METABOLIC PNL TOTAL CA: CPT | Performed by: STUDENT IN AN ORGANIZED HEALTH CARE EDUCATION/TRAINING PROGRAM

## 2023-11-03 PROCEDURE — 70450 CT HEAD/BRAIN W/O DYE: CPT

## 2023-11-03 PROCEDURE — 82948 REAGENT STRIP/BLOOD GLUCOSE: CPT

## 2023-11-03 PROCEDURE — 83735 ASSAY OF MAGNESIUM: CPT | Performed by: STUDENT IN AN ORGANIZED HEALTH CARE EDUCATION/TRAINING PROGRAM

## 2023-11-03 RX ORDER — BACLOFEN 10 MG/1
10 TABLET ORAL 3 TIMES DAILY
Status: DISCONTINUED | OUTPATIENT
Start: 2023-11-03 | End: 2023-11-06 | Stop reason: HOSPADM

## 2023-11-03 RX ADMIN — GABAPENTIN 200 MG: 100 CAPSULE ORAL at 15:50

## 2023-11-03 RX ADMIN — ATORVASTATIN CALCIUM 10 MG: 20 TABLET, FILM COATED ORAL at 21:40

## 2023-11-03 RX ADMIN — VANCOMYCIN HYDROCHLORIDE 1250 MG: 10 INJECTION, POWDER, LYOPHILIZED, FOR SOLUTION INTRAVENOUS at 06:02

## 2023-11-03 RX ADMIN — PIPERACILLIN SODIUM AND TAZOBACTAM SODIUM 3.38 G: 3; .375 INJECTION, SOLUTION INTRAVENOUS at 08:09

## 2023-11-03 RX ADMIN — PIPERACILLIN SODIUM AND TAZOBACTAM SODIUM 3.38 G: 3; .375 INJECTION, SOLUTION INTRAVENOUS at 00:40

## 2023-11-03 RX ADMIN — GABAPENTIN 200 MG: 100 CAPSULE ORAL at 08:09

## 2023-11-03 RX ADMIN — DOCUSATE SODIUM 100 MG: 100 CAPSULE, LIQUID FILLED ORAL at 08:09

## 2023-11-03 RX ADMIN — APIXABAN 2.5 MG: 2.5 TABLET, FILM COATED ORAL at 08:09

## 2023-11-03 RX ADMIN — Medication 220 MG: at 08:10

## 2023-11-03 RX ADMIN — Medication 10 ML: at 08:09

## 2023-11-03 RX ADMIN — Medication 100 MG: at 08:10

## 2023-11-03 RX ADMIN — APIXABAN 2.5 MG: 2.5 TABLET, FILM COATED ORAL at 21:40

## 2023-11-03 RX ADMIN — FUROSEMIDE 20 MG: 20 TABLET ORAL at 08:09

## 2023-11-03 RX ADMIN — FOLIC ACID 2 MG: 1 TABLET ORAL at 08:10

## 2023-11-03 RX ADMIN — OXYCODONE HYDROCHLORIDE AND ACETAMINOPHEN 1000 MG: 500 TABLET ORAL at 08:11

## 2023-11-03 RX ADMIN — HYDROCODONE BITARTRATE AND ACETAMINOPHEN 1 TABLET: 5; 325 TABLET ORAL at 21:40

## 2023-11-03 RX ADMIN — FERROUS SULFATE TAB 325 MG (65 MG ELEMENTAL FE) 325 MG: 325 (65 FE) TAB at 08:09

## 2023-11-03 RX ADMIN — DAKIN'S SOLUTION 0.125% (QUARTER STRENGTH): 0.12 SOLUTION at 17:28

## 2023-11-03 RX ADMIN — DAKIN'S SOLUTION 0.125% (QUARTER STRENGTH): 0.12 SOLUTION at 06:01

## 2023-11-03 RX ADMIN — GABAPENTIN 200 MG: 100 CAPSULE ORAL at 21:40

## 2023-11-03 RX ADMIN — MULTIPLE VITAMINS W/ MINERALS TAB 1 TABLET: TAB at 08:09

## 2023-11-03 RX ADMIN — Medication 2000 UNITS: at 08:09

## 2023-11-03 RX ADMIN — PIPERACILLIN SODIUM AND TAZOBACTAM SODIUM 3.38 G: 3; .375 INJECTION, SOLUTION INTRAVENOUS at 15:47

## 2023-11-03 RX ADMIN — SENNOSIDES 2 TABLET: 8.6 TABLET, FILM COATED ORAL at 08:10

## 2023-11-03 RX ADMIN — BACLOFEN 20 MG: 10 TABLET ORAL at 08:10

## 2023-11-03 RX ADMIN — Medication 3 MG: at 21:40

## 2023-11-03 RX ADMIN — BACLOFEN 10 MG: 10 TABLET ORAL at 21:42

## 2023-11-03 RX ADMIN — BACLOFEN 10 MG: 10 TABLET ORAL at 15:48

## 2023-11-03 RX ADMIN — SERTRALINE 50 MG: 50 TABLET, FILM COATED ORAL at 08:10

## 2023-11-03 NOTE — PLAN OF CARE
Problem: Adult Inpatient Plan of Care  Goal: Absence of Hospital-Acquired Illness or Injury  Intervention: Identify and Manage Fall Risk  Recent Flowsheet Documentation  Taken 11/3/2023 0715 by Isabella Aguirre RN  Safety Promotion/Fall Prevention: safety round/check completed     Problem: Fall Injury Risk  Goal: Absence of Fall and Fall-Related Injury  Intervention: Promote Injury-Free Environment  Recent Flowsheet Documentation  Taken 11/3/2023 0715 by Isabella Aguirre RN  Safety Promotion/Fall Prevention: safety round/check completed   Goal Outcome Evaluation:

## 2023-11-03 NOTE — PROGRESS NOTES
"  Infectious Diseases Progress Note    Law Silva MD     [facility]  [place]: 3 days  Patient Identification:  Name: Misael Sharp  Age: 68 y.o.  Sex: male  :  1955  MRN: [id number]         Primary Care Physician: Linus Martinez MD        Subjective: Denies any new complaints but confused..  Interval History: See consultation note.    Objective:    Scheduled Meds:apixaban, 2.5 mg, Oral, BID  ascorbic acid, 1,000 mg, Oral, Daily  atorvastatin, 10 mg, Oral, Nightly  baclofen, 20 mg, Oral, TID  cholecalciferol, 2,000 Units, Oral, Daily  docusate sodium, 100 mg, Oral, Daily  ferrous sulfate, 325 mg, Oral, Daily With Breakfast  folic acid, 2 mg, Oral, Daily  furosemide, 20 mg, Oral, Daily  gabapentin, 200 mg, Oral, TID  multivitamin with minerals, 1 tablet, Oral, Daily  piperacillin-tazobactam, 3.375 g, Intravenous, Q8H  senna, 2 tablet, Oral, BID  sertraline, 50 mg, Oral, Daily  sodium hypochlorite, , Topical, Q12H  thiamine, 100 mg, Oral, Daily  vancomycin, 1,250 mg, Intravenous, Q24H  zinc sulfate, 220 mg, Oral, Daily      Continuous Infusions:Pharmacy to dose vancomycin,   sodium chloride, 75 mL/hr, Last Rate: 75 mL/hr (23 0641)        Vital signs in last 24 hours:  Temp:  [97.3 °F (36.3 °C)-98.9 °F (37.2 °C)] 97.3 °F (36.3 °C)  Heart Rate:  [64-73] 72  Resp:  [16-18] 16  BP: ()/(52-90) 135/77    Intake/Output:    Intake/Output Summary (Last 24 hours) at 11/3/2023 1153  Last data filed at 11/3/2023 0500  Gross per 24 hour   Intake 480 ml   Output 2800 ml   Net -2320 ml       Exam:  /77 (BP Location: Left arm, Patient Position: Lying)   Pulse 72   Temp 97.3 °F (36.3 °C) (Oral)   Resp 16   Ht 172.7 cm (68\")   Wt 78.9 kg (174 lb)   SpO2 98%   BMI 26.46 kg/m²   Patient is examined using the personal protective equipment as per guidelines from infection control for this particular patient as enacted.  Hand washing was performed before and after patient interaction.  General " Appearance:    Alert, cooperative, no distress, AAOx3                          Head:    Normocephalic, without obvious abnormality, atraumatic                           Eyes:    PERRL, conjunctivae/corneas clear, EOM's intact, both eyes                         Throat:   Lips, tongue, gums normal; oral mucosa pink and moist                           Neck:   Supple, symmetrical, trachea midline, no JVD                         Lungs:    Clear to auscultation bilaterally, respirations unlabored                 Chest Wall:    No tenderness or deformity                          Heart:  S1-S2 tachycardia                  Abdomen:   Soft nontender                 Extremities: Disuse atrophy.                        Pulses:   Pulses palpable in all extremities                            Skin:                               Neurologic: Neurological deficits due to spinal cord injury and plegia noted.       Data Review:    I reviewed the patient's new clinical results.  Results from last 7 days   Lab Units 11/03/23  0659 11/02/23  0749 11/01/23  0620 10/31/23  0536 10/30/23  1520   WBC 10*3/mm3 10.73 9.15 8.17 11.38* 10.05   HEMOGLOBIN g/dL 8.9* 8.8* 9.2* 9.2* 10.3*   PLATELETS 10*3/mm3 368 370 336 316 300     Results from last 7 days   Lab Units 11/03/23  0659 11/02/23  0749 11/01/23 0620 10/31/23  0536 10/30/23  1520   SODIUM mmol/L 145 145 141 142 141   POTASSIUM mmol/L 4.0 4.3 3.3* 3.7 4.2   CHLORIDE mmol/L 109* 111* 107 107 104   CO2 mmol/L 30.4* 28.9 26.0 25.0 28.3   BUN mg/dL 8 8 8 10 13   CREATININE mg/dL 0.88 0.96 0.68* 0.64* 0.69*   CALCIUM mg/dL 9.3 8.8 8.9 8.8 9.3   GLUCOSE mg/dL 104* 128* 99 102* 96     Microbiology Results (last 10 days)       Procedure Component Value - Date/Time    CANDIDA AURIS SCREEN - Swab, Axilla Right, Axilla Left and Groin [[id number]]  (Normal) Collected: 10/31/23 1025    Lab Status: Preliminary result Specimen: Swab from Axilla Right, Axilla Left and Groin Updated: 11/03/23 1100      Susanne Auris Screen Culture No Candida auris isolated at 3 days    Blood Culture - Blood, Arm, Left [[id number]]  (Normal) Collected: 10/30/23 2101    Lab Status: Preliminary result Specimen: Blood from Arm, Left Updated: 11/02/23 2116     Blood Culture No growth at 3 days    Narrative:      Less than seven (7) mL's of blood was collected.  Insufficient quantity may yield false negative results.    Blood Culture - Blood, Arm, Right [[id number]]  (Normal) Collected: 10/30/23 2054    Lab Status: Preliminary result Specimen: Blood from Arm, Right Updated: 11/02/23 2116     Blood Culture No growth at 3 days    Narrative:      Less than seven (7) mL's of blood was collected.  Insufficient quantity may yield false negative results.    Urine Culture - Urine, Urine, Catheter [[id number]] Collected: 10/30/23 1547    Lab Status: Final result Specimen: Urine, Catheter Updated: 10/31/23 1243     Urine Culture >100,000 CFU/mL Mixed Karlene Isolated    Narrative:      Specimen contains mixed organisms of questionable pathogenicity suggestive of contamination. If symptoms persist, suggest recollection.  Colonization of the urinary tract without infection is common. Treatment is discouraged unless the patient is symptomatic, pregnant, or undergoing an invasive urologic procedure.    Wound Culture - Wound, Foot, Right [[id number]]  (Abnormal) Collected: 10/30/23 1537    Lab Status: Final result Specimen: Wound from Foot, Right Updated: 11/02/23 0729     Wound Culture Rare Proteus mirabilis     Comment: Refer to previous wound culture collected on 10/30/2023 1537 for MICs.           Rare Streptococcus agalactiae (Group B)     Comment:   This organism is considered to be universally susceptible to penicillin.  No further antibiotic testing will be performed. If Clindamycin or Erythromycin is the drug of choice, notify the laboratory within 7 days to request susceptibility testing.         Rare Normal Skin Karlene     Gram Stain Moderate (3+)  WBCs per low power field      Few (2+) Gram positive cocci    Narrative:              Wound Culture - Wound, Spine, Sacral [[id number]]  (Abnormal)  (Susceptibility) Collected: 10/30/23 1537    Lab Status: Preliminary result Specimen: Wound from Spine, Sacral Updated: 11/03/23 1111     Wound Culture Light growth (2+) Proteus mirabilis      Scant growth (1+) Escherichia coli      Scant growth (1+) Staphylococcus aureus, MRSA     Comment:   Methicillin resistant Staphylococcus aureus, Patient may be an isolation risk.        Gram Stain Rare (1+) WBCs per low power field      Many (4+) Gram positive cocci    Susceptibility        Proteus mirabilis      CHAD      Ampicillin Resistant      Ampicillin + Sulbactam Susceptible      Cefepime Susceptible      Ceftazidime Susceptible      Ceftriaxone Susceptible      Gentamicin Susceptible      Levofloxacin Resistant      Piperacillin + Tazobactam Susceptible      Tetracycline Resistant      Trimethoprim + Sulfamethoxazole Resistant                       Susceptibility        Escherichia coli      CHAD      Ampicillin Susceptible      Ampicillin + Sulbactam Susceptible      Cefepime Susceptible      Ceftazidime Susceptible      Ceftriaxone Susceptible      Gentamicin Susceptible      Levofloxacin Susceptible      Piperacillin + Tazobactam Susceptible      Tetracycline Susceptible      Trimethoprim + Sulfamethoxazole Susceptible                       Susceptibility Comments       Proteus mirabilis    Cefazolin sensitivity will not be reported for Enterobacteriaceae in non-urine isolates. If cefazolin is preferred, please call the microbiology lab to request an E-test.  With the exception of urinary-sourced infections, aminoglycosides should not be used as monotherapy.      Escherichia coli    Cefazolin sensitivity will not be reported for Enterobacteriaceae in non-urine isolates. If cefazolin is preferred, please call the microbiology lab to request an E-test.  With the exception  of urinary-sourced infections, aminoglycosides should not be used as monotherapy.               Wound Culture - Wound, Spine, Sacral [[id number]]  (Abnormal) Collected: 10/30/23 1537    Lab Status: Final result Specimen: Wound from Spine, Sacral Updated: 11/02/23 0726     Wound Culture Rare Proteus mirabilis      Rare Normal Skin Karlene     Gram Stain Rare (1+) WBCs per low power field      Rare (1+) Gram positive cocci    Narrative:      Refer to previous wound culture collected on 10/30/2023 1537 for MICs        Wound Culture - Wound, Spine, Sacral [[id number]]  (Abnormal) Collected: 10/30/23 1537    Lab Status: Final result Specimen: Wound from Spine, Sacral Updated: 11/02/23 0726     Wound Culture Rare Proteus mirabilis      Rare Normal Skin Karlene     Gram Stain Few (2+) WBCs per low power field      No organisms seen    Narrative:      Refer to previous wound culture collected on 10/30/2023 1537 for MICs                Assessment:    Altered mental status    Decubitus ulcer    Sacral osteomyelitis    Anemia    Elevated troponin  1-metabolic encephalopathy due to  2-progressively infected sacrococcygeal/left ischial decubitus ulcer with contiguous focus osteomyelitis with mixed infection culture positive for Proteus and E. coli and gram-positive cocci seen in the Gram stain  3-immobility and decubitus ulceration due to spinal cord injury  4-Limited database  5-neurogenic bladder with indwelling catheter in place  6-multiple skin pressure ulcers at different sites.    7-confusion and disorientation-etiology unclear     Recommendations/Discussions:  His confusion is concerning.  Continue vancomycin and Zosyn while he is here and when he is considered ready to be discharged can be switched to oral Augmentin and Bactrim to complete 10 to 14 days of treatment for soft tissue infection understanding that prolonged antibiotic therapy without correcting the underlying cause resulting in decubitus ulceration would not  translate into sustained success but would definitely buy him significant antibiotic toxicity with continued progression of underlying osteomyelitis.  This concept of care of treating episodes of systemic illness due to soft tissue infection with assessment for need for debridement discussed with patient's caring attending.  Evaluation of confusion and disorientation per primary team.  Law Silva MD  11/3/2023  11:53 EDT    Parts of this note may be an electronic transcription/translation of spoken language to printed text using the Dragon dictation system.

## 2023-11-03 NOTE — NURSING NOTE
"Wound/ostomy - dropped off some 4x4\" size white bordered gauze dressings to room. Discussed with nurse to use these with wound care, sometimes the bordered gauze will stay in place better than the optifoam's, want to seal edges as best as possible due to risk for stool contamination. Nurse reports that drainage to heel wound was green/yellow in color. LTC facility notes checked to verify wound care there and they were using dakins to all wounds from what I could tell. There were multiple treatments listed but dakins looked to be the current one ordered. Will adjust orders to use dakins to all wounds here.   "

## 2023-11-03 NOTE — PLAN OF CARE
Problem: Adjustment to Illness (Sepsis/Septic Shock)  Goal: Optimal Coping  Outcome: Ongoing, Progressing     Problem: Glycemic Control Impaired (Sepsis/Septic Shock)  Goal: Blood Glucose Level Within Desired Range  Outcome: Ongoing, Progressing     Problem: Infection Progression (Sepsis/Septic Shock)  Goal: Absence of Infection Signs and Symptoms  Outcome: Ongoing, Progressing     Problem: Nutrition Impaired (Sepsis/Septic Shock)  Goal: Optimal Nutrition Intake  Outcome: Ongoing, Progressing     Problem: Diabetes Comorbidity  Goal: Blood Glucose Level Within Targeted Range  Outcome: Ongoing, Progressing     Problem: Hypertension Comorbidity  Goal: Blood Pressure in Desired Range  Outcome: Ongoing, Progressing     Problem: Pain Chronic (Persistent) (Comorbidity Management)  Goal: Acceptable Pain Control and Functional Ability  Outcome: Ongoing, Progressing   Goal Outcome Evaluation:

## 2023-11-03 NOTE — PROGRESS NOTES
"    Name: Misael Sharp ADMIT: 10/30/2023   : 1955  PCP: Linus Martinez MD    MRN: [id number] LOS: 3 days   AGE/SEX: 68 y.o. male  ROOM: [facility]     Subjective   Subjective   Resting in bed, he is confused- he keeps repeating \"how are you baby\" repeatedly. He will respond to voice and answer some questions with one work answers but goes back to seeing \"how are you baby\" again. D/w RN and rapid team. Vitals stable, blood sugar is 119.  Stat ct head and neuro consult ordered.        Objective   Objective   Vital Signs  Temp:  [97.3 °F (36.3 °C)-98.9 °F (37.2 °C)] 97.3 °F (36.3 °C)  Heart Rate:  [64-73] 72  Resp:  [16-18] 16  BP: ()/(52-90) 135/77  SpO2:  [95 %-100 %] 98 %  on  Flow (L/min):  [2] 2;   Device (Oxygen Therapy): room air  Body mass index is 26.46 kg/m².  Physical Exam  Constitutional:       General: He is not in acute distress.     Appearance: He is ill-appearing. He is not toxic-appearing.   Cardiovascular:      Rate and Rhythm: Normal rate and regular rhythm.   Pulmonary:      Effort: Pulmonary effort is normal. No respiratory distress.      Breath sounds: Normal breath sounds. No wheezing.   Abdominal:      Palpations: Abdomen is soft.      Tenderness: There is no abdominal tenderness.   Musculoskeletal:         General: No tenderness.      Right lower leg: No edema.      Left lower leg: No edema.      Comments: B/l LE atrophied   Skin:     General: Skin is warm and dry.   Neurological:      Motor: Weakness present.      Comments: Not following commands    Withdraws from pain b/l LE and TRISH DEL CID is limp- baseline         Results Review     I reviewed the patient's new clinical results.  Results from last 7 days   Lab Units 23  0659 23  0749 23  0620 10/31/23  0536   WBC 10*3/mm3 10.73 9.15 8.17 11.38*   HEMOGLOBIN g/dL 8.9* 8.8* 9.2* 9.2*   PLATELETS 10*3/mm3 368 370 336 316     Results from last 7 days   Lab Units 23  0659 23  0749 23  0620 " "10/31/23  0536   SODIUM mmol/L 145 145 141 142   POTASSIUM mmol/L 4.0 4.3 3.3* 3.7   CHLORIDE mmol/L 109* 111* 107 107   CO2 mmol/L 30.4* 28.9 26.0 25.0   BUN mg/dL 8 8 8 10   CREATININE mg/dL 0.88 0.96 0.68* 0.64*   GLUCOSE mg/dL 104* 128* 99 102*   Estimated Creatinine Clearance: 90.9 mL/min (by C-G formula based on SCr of 0.88 mg/dL).  Results from last 7 days   Lab Units 10/30/23  1520   ALBUMIN g/dL 2.9*   BILIRUBIN mg/dL 0.4   ALK PHOS U/L 126*   AST (SGOT) U/L 28   ALT (SGPT) U/L 18     Results from last 7 days   Lab Units 11/03/23  0659 11/02/23  0749 11/01/23  0620 10/31/23  0536 10/30/23  1520   CALCIUM mg/dL 9.3 8.8 8.9 8.8 9.3   ALBUMIN g/dL  --   --   --   --  2.9*   MAGNESIUM mg/dL 2.6* 2.4 2.6*  --   --    PHOSPHORUS mg/dL 4.4 4.0 3.5  --   --      Results from last 7 days   Lab Units 10/30/23  1520   LACTATE mmol/L 1.0   No results found for: \"COVID19\"  Glucose   Date/Time Value Ref Range Status   11/03/2023 0830 119 70 - 130 mg/dL Final       CT Head Without Contrast  CT HEAD WITHOUT CONTRAST     HISTORY: Confusion, disoriented.     COMPARISON: CT head 10/30/2023.     FINDINGS: The brain and ventricles are symmetrical. There is no evidence  of hemorrhage, hydrocephalus or of abnormal extra-axial fluid. No focal  area of decreased attenuation to suggest acute infarction is identified.  Further evaluation could be performed with a MRI examination of the  brain as indicated.     The above information was called to the patient's nurse at the time of  the dictation.           Radiation dose reduction techniques were utilized, including automated  exposure control and exposure modulation based on body size.          Scheduled Medications  apixaban, 2.5 mg, Oral, BID  ascorbic acid, 1,000 mg, Oral, Daily  atorvastatin, 10 mg, Oral, Nightly  baclofen, 20 mg, Oral, TID  cholecalciferol, 2,000 Units, Oral, Daily  docusate sodium, 100 mg, Oral, Daily  ferrous sulfate, 325 mg, Oral, Daily With Breakfast  folic " acid, 2 mg, Oral, Daily  furosemide, 20 mg, Oral, Daily  gabapentin, 200 mg, Oral, TID  multivitamin with minerals, 1 tablet, Oral, Daily  piperacillin-tazobactam, 3.375 g, Intravenous, Q8H  senna, 2 tablet, Oral, BID  sertraline, 50 mg, Oral, Daily  sodium hypochlorite, , Topical, Q12H  thiamine, 100 mg, Oral, Daily  vancomycin, 1,250 mg, Intravenous, Q24H  zinc sulfate, 220 mg, Oral, Daily    Infusions  Pharmacy to dose vancomycin,   sodium chloride, 75 mL/hr, Last Rate: 75 mL/hr (11/02/23 0641)    Diet  Diet: Cardiac Diets; Healthy Heart (2-3 Na+); Texture: Regular Texture (IDDSI 7); Fluid Consistency: Thin (IDDSI 0)         I have personally reviewed:  [x]  Laboratory   []  Microbiology   [x]  Radiology   [x]  EKG/Telemetry   []  Cardiology/Vascular   []  Pathology   []  Records    Assessment/Plan     Active Hospital Problems    Diagnosis  POA    **Altered mental status [R41.82]  Yes    Decubitus ulcer [L89.90]  Unknown    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       68 y.o. male admitted with Altered mental status.    Altered mental status  - AMS from admission appears to be almost entirely resolved, he is able to tell me he is in [facility], that today is Halloween, he initially states the year as 2020 but corrects himself  - AMS could be multifactorial from sacral osteo vs polypharmacy vs other  - pt mentation fluctuating with acute worsening this am resulting in rapid response- stat CT head obtained which has been unrevealing; neuro consulted    Decubitus ulcers  Sacral osteomyelitis  - Wound care RN has evaluated and rec general surgery consult to evaluate for possible surgical intervention of wounds  - general surgery did not find any wounds requiring debridement- continue local wound care  - ID following/managing abx for sacral osteomyelitis; likely will treat with  short course of abx for SSI     Ventricular bigeminy  - no records available, cards consulted  - echo with normal systolic and diastolic fcn; no further w/u per cardiology    Anemia, normocytic  - Hgb 9.2, MCV 86  - no historical labs  - normal B12/folate, labs consistent with anemia of chronic dz with low iron, low TIBC    Elevated troponin  - stable/flat at 38 x2  - EKG without ischemic changes    Lovenox 40 mg SC daily for DVT prophylaxis.  Full code.  Discussed with nursing staff.  Anticipate discharge to SNU facility when cleared by consultants.      Eboni Martin MD  [facility]  11/03/23  12:28 EDT    I wore protective equipment throughout this patient encounter including gloves.  Hand hygiene was performed before donning protective equipment and after removal when leaving the room.    Expected Discharge Date and Time       Expected Discharge Date Expected Discharge Time    Nov 3, 2023              Copied text in this note has been reviewed and is accurate as of 11/03/23.

## 2023-11-03 NOTE — CONSULTS
"Neurology Consult Note    Consult Date: 11/3/2023    Referring MD: Antionette Rodriguez, *    Reason for Consult I have been asked to see the patient in neurological consultation to render advice and opinion regarding AMS    Patient cannot provide history due to altered mental status, history as per nursing staff, chart review, nursing home nurse.    Misael Sharp is a 68 y.o. white male with remote spinal cord injury wheelchair-bound who presented on 10/30th with altered mental status from his nursing home, at baseline he has mild dysarthria but usually alert and oriented x3.  On this admission he was found to have urinary tract infection and sacral wound with osteomyelitis, infectious disease following.  Neurology consulted for the altered mental status.  On exam he was obtunded, was able to follow few simple commands by closing his eyes and sticking his tongue out otherwise he could not follow complex commands, he was falling asleep during exam and would need sternal rub to keep him awake and then immediately goes back to sleep.  Currently he is on baclofen 20 mg 3 times daily for his history of spinal cord injury, on presentation he was on gabapentin 300 mg 3 times daily which was reduced to 200 mg 3 times daily.    History reviewed. No pertinent past medical history.    Exam  /77 (BP Location: Left arm, Patient Position: Lying)   Pulse 76   Temp 97.3 °F (36.3 °C) (Oral)   Resp 16   Ht 172.7 cm (68\")   Wt 78.9 kg (174 lb)   SpO2 98%   BMI 26.46 kg/m²     Neurological examination limited by the patient altered mental status  Gen: Obtunded, vitals reviewed  MS: oriented x0, unable to evaluate memory, judgment, language due to patient condition with altered mental status, poor attention/concentration.  Patient followed few simple commands by closing his eyes and sticking his tongue out.    CN: Blinks to threat bilaterally, pupils 2 mm reactive to the light bilaterally, VOR present, brainstem reflexes " present, moderate to severe dysarthria.    Motor: Bilateral lower extremity plegica at baseline, slight withdrawal on the upper extremities bilaterally, decreased tone  Sensory exam: Unable to assess due to patient condition    DATA:    Lab Results   Component Value Date    GLUCOSE 104 (H) 11/03/2023    CALCIUM 9.3 11/03/2023     11/03/2023    K 4.0 11/03/2023    CO2 30.4 (H) 11/03/2023     (H) 11/03/2023    BUN 8 11/03/2023    CREATININE 0.88 11/03/2023    BCR 9.1 11/03/2023    ANIONGAP 5.6 11/03/2023     Lab Results   Component Value Date    WBC 10.73 11/03/2023    HGB 8.9 (L) 11/03/2023    HCT 28.5 (L) 11/03/2023    MCV 89.3 11/03/2023     11/03/2023       Lab review: Urinalysis showed acute urinary tract infection on admission, WBC 10.7, hemoglobin 8.9, sodium 145, BUN 8, creatinine 0.88    Imaging review: I personally reviewed his CT scan of the head which showed no acute abnormality.    Diagnoses:  -Altered mental status likely metabolic encephalopathy which is multifactorial related to his UTI, sacral wound infection with osteomyelitis, medications including baclofen, rule out stroke  -Urinary tract infection  -Sacral wound infection with osteomyelitis  -Remote spinal cord injury paraplegic at baseline on baclofen      PLAN:   1.  Get brain MRI to rule out acute ischemic stroke  2.  Decrease baclofen to 10 mg 3 times daily from 20 mg, 3 times daily  3.  Treat UTI and osteomyelitis will defer to the admitting team and infectious disease.  4.  Follow delirium precautions as detailed below  5.  PT/OT evaluation when appropriate  6.  Recommend swallow evaluation as the patient was obtunded on my exam to reduce risk of aspiration pneumonia.    Delirium precautions:    1. Frequent reorientation, reminding patient not questioning patient   2. Shades up during day/down at night   3. TV off except for soft music only   4. Familiar objects at bedside   5. Encourage friends/family to spend the night  "  6. Minimize anticholingeric medications   7. Minimize polypharmacy   8. Minimize restraints, includes lines and/or foleys and/or feeding tubes as able   9. Minimize overnight checks/vitals to encourage restful consistent sleep patterns   10. Out of bed during day as much as possible       \"Dictated utilizing Dragon dictation\".    Medical Decision Making for this neurology consultation consists of the following:  Review of previous chart, including H/P, provider and nursing notes as applicable.  Review of medications and vital signs.  Review of previous labs, neuroimaging, and additional relevant diagnostics.   Interpretation of laboratory, imaging, and other diagnostic results  Discussion with other providers and family   Total face-to-face/floor time: 60 minutes.       "

## 2023-11-03 NOTE — NURSING NOTE
Rapid team arrived at 0825, Dr. Martin at bedside. Ordered stat head CT and neurology consult, per Dr. Martin patient ok to remain on [facility].

## 2023-11-03 NOTE — PROGRESS NOTES
Continued Stay Note  [facility]     Patient Name: Misael Sharp  MRN: [id number]  Today's Date: 11/3/2023    Admit Date: 10/30/2023    Plan: Returnt to [facility] when medically stable   Discharge Plan       Row Name 11/03/23 0941       Plan    Plan Returnt to [facility] when medically stable    Patient/Family in Agreement with Plan yes    Plan Comments Pt's plan remains to return to his LTC bed at [facility] when medically stable. Pharmacy updated and packet on pt chart. Pt's [facility] guardian approves plan and requests M informing of pt's dc. Mago Fontanez LCSW                   Discharge Codes    No documentation.                 Expected Discharge Date and Time       Expected Discharge Date Expected Discharge Time    Nov 6, 2023               Emerald Fontanez LCSW

## 2023-11-04 ENCOUNTER — APPOINTMENT (OUTPATIENT)
Dept: MRI IMAGING | Facility: HOSPITAL | Age: 68
DRG: 539 | End: 2023-11-04
Payer: MEDICARE

## 2023-11-04 LAB
ANION GAP SERPL CALCULATED.3IONS-SCNC: 6.9 MMOL/L (ref 5–15)
BACTERIA SPEC AEROBE CULT: ABNORMAL
BACTERIA SPEC AEROBE CULT: NORMAL
BACTERIA SPEC AEROBE CULT: NORMAL
BASOPHILS # BLD AUTO: 0.06 10*3/MM3 (ref 0–0.2)
BASOPHILS NFR BLD AUTO: 0.5 % (ref 0–1.5)
BUN SERPL-MCNC: 10 MG/DL (ref 8–23)
BUN/CREAT SERPL: 12.2 (ref 7–25)
CALCIUM SPEC-SCNC: 9.2 MG/DL (ref 8.6–10.5)
CHLORIDE SERPL-SCNC: 105 MMOL/L (ref 98–107)
CO2 SERPL-SCNC: 28.1 MMOL/L (ref 22–29)
CREAT SERPL-MCNC: 0.82 MG/DL (ref 0.76–1.27)
DEPRECATED RDW RBC AUTO: 46 FL (ref 37–54)
EGFRCR SERPLBLD CKD-EPI 2021: 96.3 ML/MIN/1.73
EOSINOPHIL # BLD AUTO: 0.38 10*3/MM3 (ref 0–0.4)
EOSINOPHIL NFR BLD AUTO: 3.3 % (ref 0.3–6.2)
ERYTHROCYTE [DISTWIDTH] IN BLOOD BY AUTOMATED COUNT: 14.4 % (ref 12.3–15.4)
GLUCOSE SERPL-MCNC: 97 MG/DL (ref 65–99)
GRAM STN SPEC: ABNORMAL
GRAM STN SPEC: ABNORMAL
HCT VFR BLD AUTO: 27.8 % (ref 37.5–51)
HGB BLD-MCNC: 8.7 G/DL (ref 13–17.7)
IMM GRANULOCYTES # BLD AUTO: 0.07 10*3/MM3 (ref 0–0.05)
IMM GRANULOCYTES NFR BLD AUTO: 0.6 % (ref 0–0.5)
LYMPHOCYTES # BLD AUTO: 2.86 10*3/MM3 (ref 0.7–3.1)
LYMPHOCYTES NFR BLD AUTO: 24.6 % (ref 19.6–45.3)
MAGNESIUM SERPL-MCNC: 2.3 MG/DL (ref 1.6–2.4)
MCH RBC QN AUTO: 27.6 PG (ref 26.6–33)
MCHC RBC AUTO-ENTMCNC: 31.3 G/DL (ref 31.5–35.7)
MCV RBC AUTO: 88.3 FL (ref 79–97)
MONOCYTES # BLD AUTO: 0.71 10*3/MM3 (ref 0.1–0.9)
MONOCYTES NFR BLD AUTO: 6.1 % (ref 5–12)
NEUTROPHILS NFR BLD AUTO: 64.9 % (ref 42.7–76)
NEUTROPHILS NFR BLD AUTO: 7.54 10*3/MM3 (ref 1.7–7)
NRBC BLD AUTO-RTO: 0 /100 WBC (ref 0–0.2)
PHOSPHATE SERPL-MCNC: 5.1 MG/DL (ref 2.5–4.5)
PLATELET # BLD AUTO: 339 10*3/MM3 (ref 140–450)
PMV BLD AUTO: 9.1 FL (ref 6–12)
POTASSIUM SERPL-SCNC: 4 MMOL/L (ref 3.5–5.2)
RBC # BLD AUTO: 3.15 10*6/MM3 (ref 4.14–5.8)
SODIUM SERPL-SCNC: 140 MMOL/L (ref 136–145)
VANCOMYCIN SERPL-MCNC: 34.2 MCG/ML (ref 5–40)
VANCOMYCIN TROUGH SERPL-MCNC: 49.2 MCG/ML (ref 5–20)
WBC NRBC COR # BLD: 11.62 10*3/MM3 (ref 3.4–10.8)

## 2023-11-04 PROCEDURE — 80202 ASSAY OF VANCOMYCIN: CPT | Performed by: STUDENT IN AN ORGANIZED HEALTH CARE EDUCATION/TRAINING PROGRAM

## 2023-11-04 PROCEDURE — 85025 COMPLETE CBC W/AUTO DIFF WBC: CPT | Performed by: STUDENT IN AN ORGANIZED HEALTH CARE EDUCATION/TRAINING PROGRAM

## 2023-11-04 PROCEDURE — 70551 MRI BRAIN STEM W/O DYE: CPT

## 2023-11-04 PROCEDURE — 80202 ASSAY OF VANCOMYCIN: CPT | Performed by: INTERNAL MEDICINE

## 2023-11-04 PROCEDURE — 83735 ASSAY OF MAGNESIUM: CPT | Performed by: STUDENT IN AN ORGANIZED HEALTH CARE EDUCATION/TRAINING PROGRAM

## 2023-11-04 PROCEDURE — 80048 BASIC METABOLIC PNL TOTAL CA: CPT | Performed by: STUDENT IN AN ORGANIZED HEALTH CARE EDUCATION/TRAINING PROGRAM

## 2023-11-04 PROCEDURE — 84100 ASSAY OF PHOSPHORUS: CPT | Performed by: STUDENT IN AN ORGANIZED HEALTH CARE EDUCATION/TRAINING PROGRAM

## 2023-11-04 PROCEDURE — 25010000002 PIPERACILLIN SOD-TAZOBACTAM PER 1 G: Performed by: INTERNAL MEDICINE

## 2023-11-04 PROCEDURE — 25010000002 PIPERACILLIN SOD-TAZOBACTAM PER 1 G: Performed by: STUDENT IN AN ORGANIZED HEALTH CARE EDUCATION/TRAINING PROGRAM

## 2023-11-04 RX ADMIN — APIXABAN 2.5 MG: 2.5 TABLET, FILM COATED ORAL at 20:52

## 2023-11-04 RX ADMIN — SENNOSIDES 2 TABLET: 8.6 TABLET, FILM COATED ORAL at 09:21

## 2023-11-04 RX ADMIN — Medication 100 MG: at 09:22

## 2023-11-04 RX ADMIN — DOCUSATE SODIUM 100 MG: 100 CAPSULE, LIQUID FILLED ORAL at 09:21

## 2023-11-04 RX ADMIN — Medication 220 MG: at 09:21

## 2023-11-04 RX ADMIN — MULTIPLE VITAMINS W/ MINERALS TAB 1 TABLET: TAB at 09:20

## 2023-11-04 RX ADMIN — SENNOSIDES 2 TABLET: 8.6 TABLET, FILM COATED ORAL at 21:00

## 2023-11-04 RX ADMIN — ATORVASTATIN CALCIUM 10 MG: 20 TABLET, FILM COATED ORAL at 20:51

## 2023-11-04 RX ADMIN — APIXABAN 2.5 MG: 2.5 TABLET, FILM COATED ORAL at 09:21

## 2023-11-04 RX ADMIN — HYDROCODONE BITARTRATE AND ACETAMINOPHEN 1 TABLET: 5; 325 TABLET ORAL at 20:51

## 2023-11-04 RX ADMIN — DAKIN'S SOLUTION 0.125% (QUARTER STRENGTH): 0.12 SOLUTION at 06:00

## 2023-11-04 RX ADMIN — PIPERACILLIN SODIUM AND TAZOBACTAM SODIUM 3.38 G: 3; .375 INJECTION, SOLUTION INTRAVENOUS at 00:00

## 2023-11-04 RX ADMIN — SERTRALINE 50 MG: 50 TABLET, FILM COATED ORAL at 09:21

## 2023-11-04 RX ADMIN — GABAPENTIN 200 MG: 100 CAPSULE ORAL at 16:49

## 2023-11-04 RX ADMIN — GABAPENTIN 200 MG: 100 CAPSULE ORAL at 09:21

## 2023-11-04 RX ADMIN — OXYCODONE HYDROCHLORIDE AND ACETAMINOPHEN 1000 MG: 500 TABLET ORAL at 09:21

## 2023-11-04 RX ADMIN — Medication 3 MG: at 20:51

## 2023-11-04 RX ADMIN — GABAPENTIN 200 MG: 100 CAPSULE ORAL at 20:51

## 2023-11-04 RX ADMIN — DAKIN'S SOLUTION 0.125% (QUARTER STRENGTH): 0.12 SOLUTION at 17:17

## 2023-11-04 RX ADMIN — FERROUS SULFATE TAB 325 MG (65 MG ELEMENTAL FE) 325 MG: 325 (65 FE) TAB at 09:21

## 2023-11-04 RX ADMIN — ACETAMINOPHEN 650 MG: 325 TABLET ORAL at 01:43

## 2023-11-04 RX ADMIN — BACLOFEN 10 MG: 10 TABLET ORAL at 16:49

## 2023-11-04 RX ADMIN — FUROSEMIDE 20 MG: 20 TABLET ORAL at 09:21

## 2023-11-04 RX ADMIN — PIPERACILLIN SODIUM AND TAZOBACTAM SODIUM 3.38 G: 3; .375 INJECTION, SOLUTION INTRAVENOUS at 16:49

## 2023-11-04 RX ADMIN — FOLIC ACID 2 MG: 1 TABLET ORAL at 09:21

## 2023-11-04 RX ADMIN — Medication 2000 UNITS: at 09:20

## 2023-11-04 RX ADMIN — BACLOFEN 10 MG: 10 TABLET ORAL at 09:21

## 2023-11-04 RX ADMIN — PIPERACILLIN SODIUM AND TAZOBACTAM SODIUM 3.38 G: 3; .375 INJECTION, SOLUTION INTRAVENOUS at 11:09

## 2023-11-04 RX ADMIN — BACLOFEN 10 MG: 10 TABLET ORAL at 20:51

## 2023-11-04 NOTE — PROGRESS NOTES
Name: Misael Sharp ADMIT: 10/30/2023   : 1955  PCP: Linus Martinez MD    MRN: [id number] LOS: 4 days   AGE/SEX: 68 y.o. male  ROOM: [facility]     Subjective   Subjective   Resting in bed, he is significantly clearer today.  He is able to tell me the year is , he is in [facility].  He is still disoriented to situation.       Objective   Objective   Vital Signs  Temp:  [97.3 °F (36.3 °C)-98.7 °F (37.1 °C)] 97.6 °F (36.4 °C)  Heart Rate:  [63-78] 78  Resp:  [16-18] 18  BP: ()/(63-82) 114/82  SpO2:  [95 %-97 %] 97 %  on  Flow (L/min):  [2] 2;   Device (Oxygen Therapy): nasal cannula  Body mass index is 26.46 kg/m².  Physical Exam  Constitutional:       General: He is not in acute distress.     Appearance: He is ill-appearing. He is not toxic-appearing.   Cardiovascular:      Rate and Rhythm: Normal rate and regular rhythm.   Pulmonary:      Effort: Pulmonary effort is normal. No respiratory distress.      Breath sounds: Normal breath sounds. No wheezing.   Abdominal:      Palpations: Abdomen is soft.      Tenderness: There is no abdominal tenderness.   Musculoskeletal:         General: No tenderness.      Right lower leg: No edema.      Left lower leg: No edema.      Comments: B/l LE deconditioned   Skin:     General: Skin is warm and dry.   Neurological:      Motor: Weakness present.      Comments: Answers questions appropriately          Results Review     I reviewed the patient's new clinical results.  Results from last 7 days   Lab Units 23  0738 23  0659 23  0749 23  0620   WBC 10*3/mm3 11.62* 10.73 9.15 8.17   HEMOGLOBIN g/dL 8.7* 8.9* 8.8* 9.2*   PLATELETS 10*3/mm3 339 368 370 336     Results from last 7 days   Lab Units 23  0738 23  0659 23  0749 23  0620   SODIUM mmol/L 140 145 145 141   POTASSIUM mmol/L 4.0 4.0 4.3 3.3*   CHLORIDE mmol/L 105 109* 111* 107   CO2 mmol/L 28.1 30.4* 28.9 26.0   BUN mg/dL 10 8 8 8   CREATININE mg/dL 0.82  "0.88 0.96 0.68*   GLUCOSE mg/dL 97 104* 128* 99   Estimated Creatinine Clearance: 97.6 mL/min (by C-G formula based on SCr of 0.82 mg/dL).  Results from last 7 days   Lab Units 10/30/23  1520   ALBUMIN g/dL 2.9*   BILIRUBIN mg/dL 0.4   ALK PHOS U/L 126*   AST (SGOT) U/L 28   ALT (SGPT) U/L 18     Results from last 7 days   Lab Units 11/04/23  0738 11/03/23  0659 11/02/23  0749 11/01/23  0620 10/31/23  0536 10/30/23  1520   CALCIUM mg/dL 9.2 9.3 8.8 8.9   < > 9.3   ALBUMIN g/dL  --   --   --   --   --  2.9*   MAGNESIUM mg/dL 2.3 2.6* 2.4 2.6*  --   --    PHOSPHORUS mg/dL 5.1* 4.4 4.0 3.5  --   --     < > = values in this interval not displayed.     Results from last 7 days   Lab Units 10/30/23  1520   LACTATE mmol/L 1.0   No results found for: \"COVID19\"  Glucose   Date/Time Value Ref Range Status   11/03/2023 0830 119 70 - 130 mg/dL Final       MRI Brain Without Contrast  Narrative: MRI OF THE BRAIN WITHOUT CONTRAST ON 11/04/2023     CLINICAL HISTORY: Altered mental status.     TECHNIQUE: Axial T1, FLAIR, fat-suppressed T2, axial diffusion and  gradient echo T2 and sagittal T1-weighted images were obtained of the  entire head.     This is correlated to head CT yesterday morning on 11/03/2023 at 9:16  a.m. There are no additional prior studies for comparison.     FINDINGS: There is minimal T2 high signal in the periventricular white  matter several tiny nodular foci in the subcortical white matter  consistent with very minimal small vessel disease. The remainder of the  brain parenchyma is normal in signal intensity. Specifically no  diffusion-weighted abnormality is seen with no acute infarct identified.  On the gradient echo T2 weighted images no acute or old blood breakdown  products are seen intracranially. The ventricles are normal in size. I  see no focal mass effect. There is no midline shift. No extra-axial  fluid collections are identified. The paranasal sinuses and the mastoid  air cells and the middle ear " cavities are clear. Good flow voids are  demonstrated within the cerebral vessels and in the dural venous  sinuses. The orbits are normal in appearance. The calvarium and skull  base are normal in appearance. On the sagittal T1-weighted images, the  cervical spine is visualized down to the C4 cervical level and there has  been previous cervical spine surgery with a corpectomy at C4 and a  cylindrical graft extending from the inferior endplate of C4 to the  superior endplate of C5 and anterior plate and screw fixation spanning  from C3 inferiorly in the inferior aspect of the plate is not assessed.  There is abnormal thinning of the upper cervical spinal cord at the C3  and C4 levels suggesting myelomalacia in the cervical spinal cord.     Impression: 1. No acute intracranial abnormality is identified.  2. Other than very minimal small vessel disease in the cerebral white  matter, the remainder the MRI of the brain itself is normal with no  acute infarct seen.  3. Partial visualization of previous upper cervical spine surgery with  corpectomy at C4 and cylindrical graft extending from the inferior  endplate of C3 superior endplate of C5 anterior plate and screw fixation  from C3 extending inferiorly and the inferior aspect of the plate is not  assessed there is abnormal thinning of the upper cervical spinal cord at  the C3 and C4 cervical levels with indicating myelomalacia in the  cervical spinal cord. This could be more comprehensively assessed with a  dedicated cervical spine MRI of clinically desired. The remainder of the  MRI of the head is normal. The results were communicated to Dr. Odom from stroke neurology by telephone on 11/04/2023 at 11:30 a.m.       Scheduled Medications  apixaban, 2.5 mg, Oral, BID  ascorbic acid, 1,000 mg, Oral, Daily  atorvastatin, 10 mg, Oral, Nightly  baclofen, 10 mg, Oral, TID  cholecalciferol, 2,000 Units, Oral, Daily  docusate sodium, 100 mg, Oral, Daily  ferrous sulfate,  325 mg, Oral, Daily With Breakfast  folic acid, 2 mg, Oral, Daily  furosemide, 20 mg, Oral, Daily  gabapentin, 200 mg, Oral, TID  multivitamin with minerals, 1 tablet, Oral, Daily  piperacillin-tazobactam, 3.375 g, Intravenous, Q8H  senna, 2 tablet, Oral, BID  sertraline, 50 mg, Oral, Daily  sodium hypochlorite, , Topical, Q12H  thiamine, 100 mg, Oral, Daily  Vancomycin Pharmacy Intermittent/Pulse Dosing, , Does not apply, Daily  zinc sulfate, 220 mg, Oral, Daily    Infusions  Pharmacy to dose vancomycin,   sodium chloride, 75 mL/hr, Last Rate: 75 mL/hr (11/02/23 0641)    Diet  Diet: Cardiac Diets; Healthy Heart (2-3 Na+); Texture: Regular Texture (IDDSI 7); Fluid Consistency: Thin (IDDSI 0)         I have personally reviewed:  [x]  Laboratory   []  Microbiology   [x]  Radiology   [x]  EKG/Telemetry   []  Cardiology/Vascular   []  Pathology   []  Records    Assessment/Plan     Active Hospital Problems    Diagnosis  POA    **Altered mental status [R41.82]  Yes    Decubitus ulcer [L89.90]  Unknown    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       68 y.o. male admitted with Altered mental status.    Altered mental status  - AMS from admission appears to be almost entirely resolved, he is able to tell me he is in [facility], that today is Halloween, he initially states the year as 2020 but corrects himself  - AMS could be multifactorial from sacral osteo vs polypharmacy vs other  - pt mentation fluctuating with acute worsening  11/3 am resulting in rapid response- stat CT head obtained which has been unrevealing; neuro consulted and recommended MRI which was negative for acute infarct; baclofen decreased from 20 TID to 10 TID  - Patient's mentation is improved again this afternoon- likely AMS related to hospital delirium, toxic metabolic encephalopathy/polypharmacy; f/u final neuro  recs    Decubitus ulcers  Sacral osteomyelitis  - Wound care RN has evaluated and rec general surgery consult to evaluate for possible surgical intervention of wounds  - general surgery did not find any wounds requiring debridement- continue local wound care  - ID following/managing abx for sacral osteomyelitis; likely will treat with short course of abx for SSI; d/w Dr. Silva    Spinal cord injury 2016, wheelchair bound since then  - continue baclofen- reduced dose    Ventricular bigeminy  - no records available, cards consulted  - echo with normal systolic and diastolic fcn; no further w/u per cardiology    Anemia, normocytic  - Hgb 9.2, MCV 86  - no historical labs  - normal B12/folate, labs consistent with anemia of chronic dz with low iron, low TIBC    Elevated troponin  - stable/flat at 38 x2  - EKG without ischemic changes  - cards evaluated; no intervention    Lovenox 40 mg SC daily for DVT prophylaxis.  Full code.  Discussed with nursing staff.  Anticipate discharge to SNU facility when cleared by consultants.      Eboni Martin MD  [facility]  11/04/23  14:48 EDT    I wore protective equipment throughout this patient encounter including gloves.  Hand hygiene was performed before donning protective equipment and after removal when leaving the room.    Expected Discharge Date and Time       Expected Discharge Date Expected Discharge Time    Nov 3, 2023              Copied text in this note has been reviewed and is accurate as of 11/04/23.

## 2023-11-04 NOTE — PROGRESS NOTES
[facility] Clinical Pharmacy Services: Vancomycin Level Monitoring Note    Misael Sharp is a 68 y.o. male who is on day 6 of pharmacy to dose vancomycin for Skin and Soft Tissue.    Estimated Creatinine Clearance: 97.6 mL/min (by C-G formula based on SCr of 0.82 mg/dL).    Current Vanc Dose: 1250 mg IV every  24  hours  Results from last 7 days   Lab Units 11/04/23  0943 11/04/23  0738 11/01/23  0620   VANCOMYCIN RM mcg/mL 34.20  --   --    VANCOMYCIN TR mcg/mL  --  49.20* 17.70     Plan: Level this am unexpectedly high, confirmed with RN that dose was not infusing when level was drawn, in fact per MAR last dose was 24 hours prior.  We checked a stat random level which confirmed elevated level.  Unsure why level is so high with normal renal function, but will hold today's dose and check random level in am.  Will re-dose when appropriate.     Next Level Date and Time: Vanc Random on 11/5 @ 0600    Bridgett Luz, PharmD  Clinical Pharmacist

## 2023-11-04 NOTE — PROGRESS NOTES
Altered mental status significantly improved on exam today, he was awake and following commands moves upper extremities against gravity and wiggling his toes.  He tells me that he had neck injury in 2016 and since that time he uses a wheelchair, denied any new weakness compared to his baseline.  He had an MRI brain which I personally reviewed and discussed with the neuroradiologist no acute stroke.      Altered mental status  Significantly improved, no further work-up needed from neurology standpoint we will sign off and see again as needed.

## 2023-11-04 NOTE — PLAN OF CARE
Problem: Skin Injury Risk Increased  Goal: Skin Health and Integrity  Outcome: Ongoing, Progressing  Intervention: Optimize Skin Protection  Recent Flowsheet Documentation  Taken 11/4/2023 1600 by Jasmine Castillo RN  Head of Bed (HOB) Positioning: HOB elevated  Taken 11/4/2023 1400 by Jasmine Castillo RN  Head of Bed (HOB) Positioning: HOB elevated  Taken 11/4/2023 1200 by Jasmine Castillo RN  Head of Bed (HOB) Positioning: HOB elevated  Taken 11/4/2023 1000 by Jasmine Castillo RN  Head of Bed (HOB) Positioning: HOB elevated  Taken 11/4/2023 0800 by Jasmine Castillo RN  Pressure Reduction Techniques:   frequent weight shift encouraged   weight shift assistance provided  Head of Bed (HOB) Positioning: HOB elevated  Pressure Reduction Devices: alternating pressure pump (ADD)  Skin Protection:   adhesive use limited   incontinence pads utilized   transparent dressing maintained   tubing/devices free from skin contact     Problem: Skin Injury Risk Increased  Goal: Skin Health and Integrity  Intervention: Optimize Skin Protection  Recent Flowsheet Documentation  Taken 11/4/2023 1600 by Jasmine Castillo RN  Head of Bed (HOB) Positioning: HOB elevated  Taken 11/4/2023 1400 by Jasmine Castillo RN  Head of Bed (HOB) Positioning: HOB elevated  Taken 11/4/2023 1200 by Jasmine Castillo RN  Head of Bed (HOB) Positioning: HOB elevated  Taken 11/4/2023 1000 by Jasmine Castillo RN  Head of Bed (HOB) Positioning: HOB elevated  Taken 11/4/2023 0800 by Jasmine Castillo RN  Pressure Reduction Techniques:   frequent weight shift encouraged   weight shift assistance provided  Head of Bed (HOB) Positioning: HOB elevated  Pressure Reduction Devices: alternating pressure pump (ADD)  Skin Protection:   adhesive use limited   incontinence pads utilized   transparent dressing maintained   tubing/devices free from skin contact

## 2023-11-04 NOTE — PLAN OF CARE
Problem: Adjustment to Illness (Sepsis/Septic Shock)  Goal: Optimal Coping  Outcome: Ongoing, Progressing  Intervention: Optimize Psychosocial Adjustment to Illness  Recent Flowsheet Documentation  Taken 11/3/2023 2000 by Mariann Sol RN  Supportive Measures: active listening utilized  Family/Support System Care: self-care encouraged     Problem: Bleeding (Sepsis/Septic Shock)  Goal: Absence of Bleeding  Outcome: Ongoing, Progressing  Intervention: Monitor and Manage Bleeding  Recent Flowsheet Documentation  Taken 11/3/2023 2000 by Mariann Sol RN  Bleeding Precautions: blood pressure closely monitored     Problem: Glycemic Control Impaired (Sepsis/Septic Shock)  Goal: Blood Glucose Level Within Desired Range  Outcome: Ongoing, Progressing     Problem: Infection Progression (Sepsis/Septic Shock)  Goal: Absence of Infection Signs and Symptoms  Outcome: Ongoing, Progressing  Intervention: Initiate Sepsis Management  Recent Flowsheet Documentation  Taken 11/4/2023 0600 by Mariann Sol RN  Infection Prevention:   hand hygiene promoted   rest/sleep promoted  Isolation Precautions: precautions maintained  Taken 11/4/2023 0400 by Mariann Sol RN  Infection Prevention:   hand hygiene promoted   rest/sleep promoted  Isolation Precautions: precautions maintained  Taken 11/4/2023 0200 by Mariann Sol RN  Infection Prevention:   rest/sleep promoted   hand hygiene promoted  Isolation Precautions: precautions maintained  Taken 11/4/2023 0000 by Mariann Sol RN  Infection Prevention:   rest/sleep promoted   hand hygiene promoted  Isolation Precautions: precautions maintained  Taken 11/3/2023 2200 by Mariann Sol RN  Infection Prevention:   hand hygiene promoted   rest/sleep promoted  Isolation Precautions: precautions maintained  Taken 11/3/2023 2000 by Mariann Sol RN  Infection Prevention:   hand hygiene promoted   rest/sleep promoted  Isolation  Precautions: precautions maintained  Intervention: Promote Recovery  Recent Flowsheet Documentation  Taken 11/3/2023 2000 by Mariann Sol RN  Activity Management: bedrest     Problem: Nutrition Impaired (Sepsis/Septic Shock)  Goal: Optimal Nutrition Intake  Outcome: Ongoing, Progressing     Problem: Skin Injury Risk Increased  Goal: Skin Health and Integrity  Outcome: Ongoing, Progressing  Intervention: Optimize Skin Protection  Recent Flowsheet Documentation  Taken 11/4/2023 0600 by Mariann Sol RN  Head of Bed (HOB) Positioning: HOB elevated  Taken 11/4/2023 0400 by Mariann Sol RN  Head of Bed (HOB) Positioning: HOB elevated  Taken 11/4/2023 0200 by Mariann Sol RN  Head of Bed (HOB) Positioning: HOB elevated  Taken 11/4/2023 0000 by Mariann Sol RN  Head of Bed (HOB) Positioning: HOB elevated  Taken 11/3/2023 2200 by Mariann Sol RN  Head of Bed (HOB) Positioning: HOB elevated  Taken 11/3/2023 2000 by Mariann Sol RN  Pressure Reduction Techniques: frequent weight shift encouraged  Head of Bed (HOB) Positioning: HOB elevated  Pressure Reduction Devices: specialty bed utilized  Skin Protection: adhesive use limited     Problem: Adult Inpatient Plan of Care  Goal: Plan of Care Review  Outcome: Ongoing, Progressing  Flowsheets (Taken 11/4/2023 0651)  Plan of Care Reviewed With: patient  Goal: Patient-Specific Goal (Individualized)  Outcome: Ongoing, Progressing  Goal: Absence of Hospital-Acquired Illness or Injury  Outcome: Ongoing, Progressing  Intervention: Identify and Manage Fall Risk  Recent Flowsheet Documentation  Taken 11/4/2023 0600 by Mariann Sol RN  Safety Promotion/Fall Prevention: safety round/check completed  Taken 11/4/2023 0400 by Mariann Sol RN  Safety Promotion/Fall Prevention: safety round/check completed  Taken 11/4/2023 0200 by Mariann Sol RN  Safety Promotion/Fall Prevention: safety  round/check completed  Taken 11/4/2023 0000 by Mariann Sol RN  Safety Promotion/Fall Prevention: safety round/check completed  Taken 11/3/2023 2200 by Mariann Sol RN  Safety Promotion/Fall Prevention: safety round/check completed  Taken 11/3/2023 2000 by Mariann Sol RN  Safety Promotion/Fall Prevention:   assistive device/personal items within reach   safety round/check completed  Intervention: Prevent Skin Injury  Recent Flowsheet Documentation  Taken 11/4/2023 0600 by Mariann Sol RN  Body Position:   supine, legs elevated   weight shifting  Taken 11/4/2023 0400 by Mariann Sol RN  Body Position:   right   weight shifting   tilted  Taken 11/4/2023 0200 by Mariann Sol RN  Body Position:   left   turned   tilted  Taken 11/4/2023 0000 by Mariann Sol RN  Body Position: supine, legs elevated  Taken 11/3/2023 2200 by Mariann Sol RN  Body Position:   right   turned  Taken 11/3/2023 2000 by Mariann Sol RN  Body Position:   supine, legs elevated   tilted   weight shifting  Skin Protection: adhesive use limited  Intervention: Prevent and Manage VTE (Venous Thromboembolism) Risk  Recent Flowsheet Documentation  Taken 11/3/2023 2000 by Mariann Sol RN  Activity Management: bedrest  Range of Motion: active ROM (range of motion) encouraged  Intervention: Prevent Infection  Recent Flowsheet Documentation  Taken 11/4/2023 0600 by Mariann Sol RN  Infection Prevention:   hand hygiene promoted   rest/sleep promoted  Taken 11/4/2023 0400 by Mariann Sol RN  Infection Prevention:   hand hygiene promoted   rest/sleep promoted  Taken 11/4/2023 0200 by Mariann Sol RN  Infection Prevention:   rest/sleep promoted   hand hygiene promoted  Taken 11/4/2023 0000 by Mariann Sol RN  Infection Prevention:   rest/sleep promoted   hand hygiene promoted  Taken 11/3/2023 2200 by Mariann Sol  RN  Infection Prevention:   hand hygiene promoted   rest/sleep promoted  Taken 11/3/2023 2000 by Mariann Sol RN  Infection Prevention:   hand hygiene promoted   rest/sleep promoted  Goal: Optimal Comfort and Wellbeing  Outcome: Ongoing, Progressing  Intervention: Monitor Pain and Promote Comfort  Recent Flowsheet Documentation  Taken 11/3/2023 2000 by Mariann Sol RN  Pain Management Interventions: see MAR  Intervention: Provide Person-Centered Care  Recent Flowsheet Documentation  Taken 11/3/2023 2000 by Mariann Sol RN  Trust Relationship/Rapport: care explained  Goal: Readiness for Transition of Care  Outcome: Ongoing, Progressing     Problem: Fall Injury Risk  Goal: Absence of Fall and Fall-Related Injury  Outcome: Ongoing, Progressing  Intervention: Identify and Manage Contributors  Recent Flowsheet Documentation  Taken 11/4/2023 0600 by Mariann Sol RN  Medication Review/Management: medications reviewed  Taken 11/4/2023 0400 by Mariann Sol RN  Medication Review/Management: medications reviewed  Taken 11/4/2023 0200 by Mariann Sol RN  Medication Review/Management: medications reviewed  Taken 11/4/2023 0000 by Mariann Sol RN  Medication Review/Management: medications reviewed  Taken 11/3/2023 2200 by Mariann Sol RN  Medication Review/Management: medications reviewed  Taken 11/3/2023 2000 by Mariann Sol RN  Medication Review/Management: medications reviewed  Intervention: Promote Injury-Free Environment  Recent Flowsheet Documentation  Taken 11/4/2023 0600 by Mariann Sol RN  Safety Promotion/Fall Prevention: safety round/check completed  Taken 11/4/2023 0400 by Mariann Sol RN  Safety Promotion/Fall Prevention: safety round/check completed  Taken 11/4/2023 0200 by Mariann Sol RN  Safety Promotion/Fall Prevention: safety round/check completed  Taken 11/4/2023 0000 by Nubia Phillips  FRANK Waite  Safety Promotion/Fall Prevention: safety round/check completed  Taken 11/3/2023 2200 by Mariann Sol RN  Safety Promotion/Fall Prevention: safety round/check completed  Taken 11/3/2023 2000 by Mariann Sol RN  Safety Promotion/Fall Prevention:   assistive device/personal items within reach   safety round/check completed     Problem: Asthma Comorbidity  Goal: Maintenance of Asthma Control  Outcome: Ongoing, Progressing  Intervention: Maintain Asthma Symptom Control  Recent Flowsheet Documentation  Taken 11/4/2023 0600 by Mariann Sol RN  Medication Review/Management: medications reviewed  Taken 11/4/2023 0400 by Mariann Sol RN  Medication Review/Management: medications reviewed  Taken 11/4/2023 0200 by Mariann Sol RN  Medication Review/Management: medications reviewed  Taken 11/4/2023 0000 by Mariann Sol RN  Medication Review/Management: medications reviewed  Taken 11/3/2023 2200 by Mariann Sol RN  Medication Review/Management: medications reviewed  Taken 11/3/2023 2000 by Mariann Sol RN  Medication Review/Management: medications reviewed     Problem: Behavioral Health Comorbidity  Goal: Maintenance of Behavioral Health Symptom Control  Outcome: Ongoing, Progressing  Intervention: Maintain Behavioral Health Symptom Control  Recent Flowsheet Documentation  Taken 11/4/2023 0600 by Mariann Sol RN  Medication Review/Management: medications reviewed  Taken 11/4/2023 0400 by Mariann Sol RN  Medication Review/Management: medications reviewed  Taken 11/4/2023 0200 by Mariann Sol RN  Medication Review/Management: medications reviewed  Taken 11/4/2023 0000 by Mariann Sol RN  Medication Review/Management: medications reviewed  Taken 11/3/2023 2200 by Mariann Sol RN  Medication Review/Management: medications reviewed  Taken 11/3/2023 2000 by Mariann Sol, FRANK  Medication  Review/Management: medications reviewed     Problem: COPD (Chronic Obstructive Pulmonary Disease) Comorbidity  Goal: Maintenance of COPD Symptom Control  Outcome: Ongoing, Progressing  Intervention: Maintain COPD-Symptom Control  Recent Flowsheet Documentation  Taken 11/4/2023 0600 by Mariann Sol RN  Medication Review/Management: medications reviewed  Taken 11/4/2023 0400 by Mariann Sol RN  Medication Review/Management: medications reviewed  Taken 11/4/2023 0200 by Mariann Sol RN  Medication Review/Management: medications reviewed  Taken 11/4/2023 0000 by Mariann Sol RN  Medication Review/Management: medications reviewed  Taken 11/3/2023 2200 by Mariann Sol RN  Medication Review/Management: medications reviewed  Taken 11/3/2023 2000 by Mariann Sol RN  Supportive Measures: active listening utilized  Medication Review/Management: medications reviewed     Problem: Diabetes Comorbidity  Goal: Blood Glucose Level Within Targeted Range  Outcome: Ongoing, Progressing     Problem: Heart Failure Comorbidity  Goal: Maintenance of Heart Failure Symptom Control  Outcome: Ongoing, Progressing  Intervention: Maintain Heart Failure-Management  Recent Flowsheet Documentation  Taken 11/4/2023 0600 by Mariann Sol RN  Medication Review/Management: medications reviewed  Taken 11/4/2023 0400 by Mariann Sol RN  Medication Review/Management: medications reviewed  Taken 11/4/2023 0200 by Mariann Sol RN  Medication Review/Management: medications reviewed  Taken 11/4/2023 0000 by Mariann Sol RN  Medication Review/Management: medications reviewed  Taken 11/3/2023 2200 by Mariann Sol RN  Medication Review/Management: medications reviewed  Taken 11/3/2023 2000 by Mariann Sol RN  Medication Review/Management: medications reviewed     Problem: Hypertension Comorbidity  Goal: Blood Pressure in Desired Range  Outcome:  Ongoing, Progressing  Intervention: Maintain Blood Pressure Management  Recent Flowsheet Documentation  Taken 11/4/2023 0600 by Mariann Sol RN  Medication Review/Management: medications reviewed  Taken 11/4/2023 0400 by Mariann Sol RN  Medication Review/Management: medications reviewed  Taken 11/4/2023 0200 by Mariann Sol RN  Medication Review/Management: medications reviewed  Taken 11/4/2023 0000 by Mariann Sol RN  Medication Review/Management: medications reviewed  Taken 11/3/2023 2200 by Mariann Sol RN  Medication Review/Management: medications reviewed  Taken 11/3/2023 2000 by Mariann Sol RN  Medication Review/Management: medications reviewed     Problem: Obstructive Sleep Apnea Risk or Actual Comorbidity Management  Goal: Unobstructed Breathing During Sleep  Outcome: Ongoing, Progressing     Problem: Osteoarthritis Comorbidity  Goal: Maintenance of Osteoarthritis Symptom Control  Outcome: Ongoing, Progressing  Intervention: Maintain Osteoarthritis Symptom Control  Recent Flowsheet Documentation  Taken 11/4/2023 0600 by Mariann Sol RN  Medication Review/Management: medications reviewed  Taken 11/4/2023 0400 by Mariann Sol RN  Medication Review/Management: medications reviewed  Taken 11/4/2023 0200 by Mariann Sol RN  Medication Review/Management: medications reviewed  Taken 11/4/2023 0000 by Mariann Sol RN  Medication Review/Management: medications reviewed  Taken 11/3/2023 2200 by Mariann Sol RN  Medication Review/Management: medications reviewed  Taken 11/3/2023 2000 by Mariann Sol RN  Activity Management: bedrest  Medication Review/Management: medications reviewed     Problem: Pain Chronic (Persistent) (Comorbidity Management)  Goal: Acceptable Pain Control and Functional Ability  Outcome: Ongoing, Progressing  Intervention: Manage Persistent Pain  Recent Flowsheet  Documentation  Taken 11/4/2023 0600 by Mariann Sol RN  Medication Review/Management: medications reviewed  Taken 11/4/2023 0400 by Mariann Sol RN  Medication Review/Management: medications reviewed  Taken 11/4/2023 0200 by Mariann Sol RN  Medication Review/Management: medications reviewed  Taken 11/4/2023 0000 by Mariann Sol RN  Medication Review/Management: medications reviewed  Taken 11/3/2023 2200 by Mariann Sol RN  Medication Review/Management: medications reviewed  Taken 11/3/2023 2000 by Mariann Sol RN  Medication Review/Management: medications reviewed  Intervention: Develop Pain Management Plan  Recent Flowsheet Documentation  Taken 11/3/2023 2000 by Mariann oSl RN  Pain Management Interventions: see MAR  Intervention: Optimize Psychosocial Wellbeing  Recent Flowsheet Documentation  Taken 11/3/2023 2000 by Mariann Sol RN  Supportive Measures: active listening utilized  Diversional Activities: television  Family/Support System Care: self-care encouraged     Problem: Seizure Disorder Comorbidity  Goal: Maintenance of Seizure Control  Outcome: Ongoing, Progressing  Intervention: Maintain Seizure-Symptom Control  Recent Flowsheet Documentation  Taken 11/3/2023 2000 by Mariann Sol RN  Seizure Precautions: activity supervised   Goal Outcome Evaluation:  Plan of Care Reviewed With: patient

## 2023-11-05 LAB
ANION GAP SERPL CALCULATED.3IONS-SCNC: 8 MMOL/L (ref 5–15)
BACTERIA ISLT: NORMAL
BASOPHILS # BLD AUTO: 0.09 10*3/MM3 (ref 0–0.2)
BASOPHILS NFR BLD AUTO: 0.8 % (ref 0–1.5)
BUN SERPL-MCNC: 23 MG/DL (ref 8–23)
BUN/CREAT SERPL: 25.3 (ref 7–25)
CALCIUM SPEC-SCNC: 9.2 MG/DL (ref 8.6–10.5)
CHLORIDE SERPL-SCNC: 100 MMOL/L (ref 98–107)
CO2 SERPL-SCNC: 28 MMOL/L (ref 22–29)
CREAT SERPL-MCNC: 0.91 MG/DL (ref 0.76–1.27)
DEPRECATED RDW RBC AUTO: 44.1 FL (ref 37–54)
EGFRCR SERPLBLD CKD-EPI 2021: 92.4 ML/MIN/1.73
EOSINOPHIL # BLD AUTO: 0.42 10*3/MM3 (ref 0–0.4)
EOSINOPHIL NFR BLD AUTO: 3.6 % (ref 0.3–6.2)
ERYTHROCYTE [DISTWIDTH] IN BLOOD BY AUTOMATED COUNT: 14.2 % (ref 12.3–15.4)
GLUCOSE SERPL-MCNC: 84 MG/DL (ref 65–99)
HCT VFR BLD AUTO: 27.2 % (ref 37.5–51)
HGB BLD-MCNC: 8.6 G/DL (ref 13–17.7)
IMM GRANULOCYTES # BLD AUTO: 0.1 10*3/MM3 (ref 0–0.05)
IMM GRANULOCYTES NFR BLD AUTO: 0.9 % (ref 0–0.5)
LYMPHOCYTES # BLD AUTO: 2.89 10*3/MM3 (ref 0.7–3.1)
LYMPHOCYTES NFR BLD AUTO: 24.7 % (ref 19.6–45.3)
MAGNESIUM SERPL-MCNC: 3.5 MG/DL (ref 1.6–2.4)
MCH RBC QN AUTO: 27 PG (ref 26.6–33)
MCHC RBC AUTO-ENTMCNC: 31.6 G/DL (ref 31.5–35.7)
MCV RBC AUTO: 85.3 FL (ref 79–97)
MONOCYTES # BLD AUTO: 1.02 10*3/MM3 (ref 0.1–0.9)
MONOCYTES NFR BLD AUTO: 8.7 % (ref 5–12)
NEUTROPHILS NFR BLD AUTO: 61.3 % (ref 42.7–76)
NEUTROPHILS NFR BLD AUTO: 7.2 10*3/MM3 (ref 1.7–7)
NRBC BLD AUTO-RTO: 0 /100 WBC (ref 0–0.2)
PHOSPHATE SERPL-MCNC: 4.4 MG/DL (ref 2.5–4.5)
PLATELET # BLD AUTO: 360 10*3/MM3 (ref 140–450)
PMV BLD AUTO: 9.6 FL (ref 6–12)
POTASSIUM SERPL-SCNC: 4 MMOL/L (ref 3.5–5.2)
RBC # BLD AUTO: 3.19 10*6/MM3 (ref 4.14–5.8)
SODIUM SERPL-SCNC: 136 MMOL/L (ref 136–145)
VANCOMYCIN SERPL-MCNC: 18.6 MCG/ML (ref 5–40)
WBC NRBC COR # BLD: 11.72 10*3/MM3 (ref 3.4–10.8)

## 2023-11-05 PROCEDURE — 80048 BASIC METABOLIC PNL TOTAL CA: CPT | Performed by: STUDENT IN AN ORGANIZED HEALTH CARE EDUCATION/TRAINING PROGRAM

## 2023-11-05 PROCEDURE — 83735 ASSAY OF MAGNESIUM: CPT | Performed by: STUDENT IN AN ORGANIZED HEALTH CARE EDUCATION/TRAINING PROGRAM

## 2023-11-05 PROCEDURE — 85025 COMPLETE CBC W/AUTO DIFF WBC: CPT | Performed by: STUDENT IN AN ORGANIZED HEALTH CARE EDUCATION/TRAINING PROGRAM

## 2023-11-05 PROCEDURE — 25010000002 PIPERACILLIN SOD-TAZOBACTAM PER 1 G: Performed by: STUDENT IN AN ORGANIZED HEALTH CARE EDUCATION/TRAINING PROGRAM

## 2023-11-05 PROCEDURE — 80202 ASSAY OF VANCOMYCIN: CPT | Performed by: STUDENT IN AN ORGANIZED HEALTH CARE EDUCATION/TRAINING PROGRAM

## 2023-11-05 PROCEDURE — 84100 ASSAY OF PHOSPHORUS: CPT | Performed by: STUDENT IN AN ORGANIZED HEALTH CARE EDUCATION/TRAINING PROGRAM

## 2023-11-05 RX ADMIN — SENNOSIDES 2 TABLET: 8.6 TABLET, FILM COATED ORAL at 21:14

## 2023-11-05 RX ADMIN — FUROSEMIDE 20 MG: 20 TABLET ORAL at 08:16

## 2023-11-05 RX ADMIN — HYDROCODONE BITARTRATE AND ACETAMINOPHEN 1 TABLET: 5; 325 TABLET ORAL at 14:54

## 2023-11-05 RX ADMIN — PIPERACILLIN SODIUM AND TAZOBACTAM SODIUM 3.38 G: 3; .375 INJECTION, SOLUTION INTRAVENOUS at 00:00

## 2023-11-05 RX ADMIN — GABAPENTIN 200 MG: 100 CAPSULE ORAL at 08:15

## 2023-11-05 RX ADMIN — HYDROCODONE BITARTRATE AND ACETAMINOPHEN 1 TABLET: 5; 325 TABLET ORAL at 08:16

## 2023-11-05 RX ADMIN — FOLIC ACID 2 MG: 1 TABLET ORAL at 08:16

## 2023-11-05 RX ADMIN — OXYCODONE HYDROCHLORIDE AND ACETAMINOPHEN 1000 MG: 500 TABLET ORAL at 08:16

## 2023-11-05 RX ADMIN — SENNOSIDES 2 TABLET: 8.6 TABLET, FILM COATED ORAL at 08:16

## 2023-11-05 RX ADMIN — Medication 220 MG: at 08:16

## 2023-11-05 RX ADMIN — Medication 2000 UNITS: at 08:15

## 2023-11-05 RX ADMIN — SERTRALINE 50 MG: 50 TABLET, FILM COATED ORAL at 08:16

## 2023-11-05 RX ADMIN — DAKIN'S SOLUTION 0.125% (QUARTER STRENGTH): 0.12 SOLUTION at 18:27

## 2023-11-05 RX ADMIN — APIXABAN 2.5 MG: 2.5 TABLET, FILM COATED ORAL at 21:12

## 2023-11-05 RX ADMIN — APIXABAN 2.5 MG: 2.5 TABLET, FILM COATED ORAL at 08:16

## 2023-11-05 RX ADMIN — GABAPENTIN 200 MG: 100 CAPSULE ORAL at 21:11

## 2023-11-05 RX ADMIN — Medication 100 MG: at 08:16

## 2023-11-05 RX ADMIN — PIPERACILLIN SODIUM AND TAZOBACTAM SODIUM 3.38 G: 3; .375 INJECTION, SOLUTION INTRAVENOUS at 08:16

## 2023-11-05 RX ADMIN — BACLOFEN 10 MG: 10 TABLET ORAL at 21:13

## 2023-11-05 RX ADMIN — GABAPENTIN 200 MG: 100 CAPSULE ORAL at 14:55

## 2023-11-05 RX ADMIN — MULTIPLE VITAMINS W/ MINERALS TAB 1 TABLET: TAB at 08:16

## 2023-11-05 RX ADMIN — DAKIN'S SOLUTION 0.125% (QUARTER STRENGTH): 0.12 SOLUTION at 05:30

## 2023-11-05 RX ADMIN — BACLOFEN 10 MG: 10 TABLET ORAL at 08:16

## 2023-11-05 RX ADMIN — DOCUSATE SODIUM 100 MG: 100 CAPSULE, LIQUID FILLED ORAL at 08:16

## 2023-11-05 RX ADMIN — ATORVASTATIN CALCIUM 10 MG: 20 TABLET, FILM COATED ORAL at 21:12

## 2023-11-05 RX ADMIN — FERROUS SULFATE TAB 325 MG (65 MG ELEMENTAL FE) 325 MG: 325 (65 FE) TAB at 08:16

## 2023-11-05 RX ADMIN — BACLOFEN 10 MG: 10 TABLET ORAL at 14:55

## 2023-11-05 RX ADMIN — PIPERACILLIN SODIUM AND TAZOBACTAM SODIUM 3.38 G: 3; .375 INJECTION, SOLUTION INTRAVENOUS at 14:56

## 2023-11-05 NOTE — PROGRESS NOTES
"  Infectious Diseases Progress Note    Law Silva MD     [facility]  [place]: 5 days  Patient Identification:  Name: Misael Sharp  Age: 68 y.o.  Sex: male  :  1955  MRN: [id number]         Primary Care Physician: Linus Martinez MD        Subjective: Doing well and denies any specific complaints.  Awaiting antibiotics without any problem.  Interval History: See consultation note.    Objective:    Scheduled Meds:apixaban, 2.5 mg, Oral, BID  ascorbic acid, 1,000 mg, Oral, Daily  atorvastatin, 10 mg, Oral, Nightly  baclofen, 10 mg, Oral, TID  cholecalciferol, 2,000 Units, Oral, Daily  docusate sodium, 100 mg, Oral, Daily  ferrous sulfate, 325 mg, Oral, Daily With Breakfast  folic acid, 2 mg, Oral, Daily  furosemide, 20 mg, Oral, Daily  gabapentin, 200 mg, Oral, TID  multivitamin with minerals, 1 tablet, Oral, Daily  piperacillin-tazobactam, 3.375 g, Intravenous, Q8H  senna, 2 tablet, Oral, BID  sertraline, 50 mg, Oral, Daily  sodium hypochlorite, , Topical, Q12H  thiamine, 100 mg, Oral, Daily  Vancomycin Pharmacy Intermittent/Pulse Dosing, , Does not apply, Daily  zinc sulfate, 220 mg, Oral, Daily      Continuous Infusions:Pharmacy to dose vancomycin,   sodium chloride, 75 mL/hr, Last Rate: 75 mL/hr (23 0641)        Vital signs in last 24 hours:  Temp:  [97.6 °F (36.4 °C)-98.7 °F (37.1 °C)] 98.1 °F (36.7 °C)  Heart Rate:  [75-79] 78  Resp:  [18] 18  BP: ()/(62-82) 96/67    Intake/Output:    Intake/Output Summary (Last 24 hours) at 2023 0734  Last data filed at 2023 0400  Gross per 24 hour   Intake 480 ml   Output 2550 ml   Net -2070 ml       Exam:  BP 96/67 (BP Location: Left arm, Patient Position: Lying)   Pulse 78   Temp 98.1 °F (36.7 °C) (Oral)   Resp 18   Ht 172.7 cm (68\")   Wt 78.9 kg (174 lb)   SpO2 94%   BMI 26.46 kg/m²   Patient is examined using the personal protective equipment as per guidelines from infection control for this particular patient as enacted.  " Hand washing was performed before and after patient interaction.  General Appearance:    Alert, cooperative, no distress, AAOx3                          Head:    Normocephalic, without obvious abnormality, atraumatic                           Eyes:    PERRL, conjunctivae/corneas clear, EOM's intact, both eyes                         Throat:   Lips, tongue, gums normal; oral mucosa pink and moist                           Neck:   Supple, symmetrical, trachea midline, no JVD                         Lungs:    Clear to auscultation bilaterally, respirations unlabored                 Chest Wall:    No tenderness or deformity                          Heart:  S1-S2 tachycardia                  Abdomen:   Soft nontender                 Extremities: Disuse atrophy.                        Pulses:   Pulses palpable in all extremities                            Skin:                               Neurologic: Neurological deficits due to spinal cord injury and plegia noted.       Data Review:    I reviewed the patient's new clinical results.  Results from last 7 days   Lab Units 11/04/23  0738 11/03/23  0659 11/02/23  0749 11/01/23  0620 10/31/23  0536 10/30/23  1520   WBC 10*3/mm3 11.62* 10.73 9.15 8.17 11.38* 10.05   HEMOGLOBIN g/dL 8.7* 8.9* 8.8* 9.2* 9.2* 10.3*   PLATELETS 10*3/mm3 339 368 370 336 316 300     Results from last 7 days   Lab Units 11/04/23  0738 11/03/23  0659 11/02/23  0749 11/01/23  0620 10/31/23  0536 10/30/23  1520   SODIUM mmol/L 140 145 145 141 142 141   POTASSIUM mmol/L 4.0 4.0 4.3 3.3* 3.7 4.2   CHLORIDE mmol/L 105 109* 111* 107 107 104   CO2 mmol/L 28.1 30.4* 28.9 26.0 25.0 28.3   BUN mg/dL 10 8 8 8 10 13   CREATININE mg/dL 0.82 0.88 0.96 0.68* 0.64* 0.69*   CALCIUM mg/dL 9.2 9.3 8.8 8.9 8.8 9.3   GLUCOSE mg/dL 97 104* 128* 99 102* 96     Microbiology Results (last 10 days)       Procedure Component Value - Date/Time    CANDIDA AURIS SCREEN - Swab, Axilla Right, Axilla Left and Groin [[id number]]   (Normal) Collected: 10/31/23 1025    Lab Status: Preliminary result Specimen: Swab from Axilla Right, Axilla Left and Groin Updated: 11/04/23 1100     Candida Auris Screen Culture No Candida auris isolated at 4 days    Blood Culture - Blood, Arm, Left [[id number]]  (Normal) Collected: 10/30/23 2101    Lab Status: Final result Specimen: Blood from Arm, Left Updated: 11/04/23 2115     Blood Culture No growth at 5 days    Narrative:      Less than seven (7) mL's of blood was collected.  Insufficient quantity may yield false negative results.    Blood Culture - Blood, Arm, Right [[id number]]  (Normal) Collected: 10/30/23 2054    Lab Status: Final result Specimen: Blood from Arm, Right Updated: 11/04/23 2115     Blood Culture No growth at 5 days    Narrative:      Less than seven (7) mL's of blood was collected.  Insufficient quantity may yield false negative results.    Urine Culture - Urine, Urine, Catheter [[id number]] Collected: 10/30/23 1547    Lab Status: Final result Specimen: Urine, Catheter Updated: 10/31/23 1243     Urine Culture >100,000 CFU/mL Mixed Karlene Isolated    Narrative:      Specimen contains mixed organisms of questionable pathogenicity suggestive of contamination. If symptoms persist, suggest recollection.  Colonization of the urinary tract without infection is common. Treatment is discouraged unless the patient is symptomatic, pregnant, or undergoing an invasive urologic procedure.    Wound Culture - Wound, Foot, Right [[id number]]  (Abnormal) Collected: 10/30/23 1537    Lab Status: Final result Specimen: Wound from Foot, Right Updated: 11/02/23 0729     Wound Culture Rare Proteus mirabilis     Comment: Refer to previous wound culture collected on 10/30/2023 1537 for MICs.           Rare Streptococcus agalactiae (Group B)     Comment:   This organism is considered to be universally susceptible to penicillin.  No further antibiotic testing will be performed. If Clindamycin or Erythromycin is the drug of  choice, notify the laboratory within 7 days to request susceptibility testing.         Rare Normal Skin Karleen     Gram Stain Moderate (3+) WBCs per low power field      Few (2+) Gram positive cocci    Narrative:              Wound Culture - Wound, Spine, Sacral [[id number]]  (Abnormal)  (Susceptibility) Collected: 10/30/23 1538    Lab Status: Final result Specimen: Wound from Spine, Sacral Updated: 11/04/23 1005     Wound Culture Light growth (2+) Proteus mirabilis      Scant growth (1+) Escherichia coli      Scant growth (1+) Staphylococcus aureus, MRSA     Comment:   Methicillin resistant Staphylococcus aureus, Patient may be an isolation risk.        Gram Stain Rare (1+) WBCs per low power field      Many (4+) Gram positive cocci    Susceptibility        Proteus mirabilis      CHAD      Ampicillin Resistant      Ampicillin + Sulbactam Susceptible      Cefepime Susceptible      Ceftazidime Susceptible      Ceftriaxone Susceptible      Gentamicin Susceptible      Levofloxacin Resistant      Piperacillin + Tazobactam Susceptible      Tetracycline Resistant      Trimethoprim + Sulfamethoxazole Resistant                       Susceptibility        Escherichia coli      CHAD      Ampicillin Susceptible      Ampicillin + Sulbactam Susceptible      Cefepime Susceptible      Ceftazidime Susceptible      Ceftriaxone Susceptible      Gentamicin Susceptible      Levofloxacin Susceptible      Piperacillin + Tazobactam Susceptible      Tetracycline Susceptible      Trimethoprim + Sulfamethoxazole Susceptible                       Susceptibility        Staphylococcus aureus, MRSA      CHAD      Clindamycin Resistant      Erythromycin Resistant      Inducible Clindamycin Resistance Negative      Oxacillin Resistant      Rifampin Susceptible      Tetracycline Susceptible      Trimethoprim + Sulfamethoxazole Susceptible      Vancomycin Susceptible                       Susceptibility Comments       Proteus mirabilis    Cefazolin  sensitivity will not be reported for Enterobacteriaceae in non-urine isolates. If cefazolin is preferred, please call the microbiology lab to request an E-test.  With the exception of urinary-sourced infections, aminoglycosides should not be used as monotherapy.      Escherichia coli    Cefazolin sensitivity will not be reported for Enterobacteriaceae in non-urine isolates. If cefazolin is preferred, please call the microbiology lab to request an E-test.  With the exception of urinary-sourced infections, aminoglycosides should not be used as monotherapy.               Wound Culture - Wound, Spine, Sacral [[id number]]  (Abnormal) Collected: 10/30/23 1537    Lab Status: Final result Specimen: Wound from Spine, Sacral Updated: 11/02/23 0726     Wound Culture Rare Proteus mirabilis      Rare Normal Skin Karlene     Gram Stain Rare (1+) WBCs per low power field      Rare (1+) Gram positive cocci    Narrative:      Refer to previous wound culture collected on 10/30/2023 1537 for MICs        Wound Culture - Wound, Spine, Sacral [[id number]]  (Abnormal) Collected: 10/30/23 1537    Lab Status: Final result Specimen: Wound from Spine, Sacral Updated: 11/02/23 0726     Wound Culture Rare Proteus mirabilis      Rare Normal Skin Karlene     Gram Stain Few (2+) WBCs per low power field      No organisms seen    Narrative:      Refer to previous wound culture collected on 10/30/2023 1537 for MICs                Assessment:    Altered mental status    Decubitus ulcer    Sacral osteomyelitis    Anemia    Elevated troponin  1-metabolic encephalopathy due to  2-progressively infected sacrococcygeal/left ischial decubitus ulcer with contiguous focus osteomyelitis with mixed infection culture positive for Proteus and E. coli and gram-positive cocci seen in the Gram stain  3-immobility and decubitus ulceration due to spinal cord injury  4-Limited database  5-neurogenic bladder with indwelling catheter in place  6-multiple skin pressure ulcers  at different sites.    7-confusion and disorientation-etiology unclear     Recommendations/Discussions:  His confusion is concerning.  Continue vancomycin and Zosyn while he is here and when he is considered ready to be discharged can be switched to oral Augmentin and Bactrim to complete 10 to 14 days of treatment for soft tissue infection understanding that prolonged antibiotic therapy without correcting the underlying cause resulting in decubitus ulceration would not translate into sustained success but would definitely buy him significant antibiotic toxicity with continued progression of underlying osteomyelitis.  This concept of care of treating episodes of systemic illness due to soft tissue infection with assessment for need for debridement discussed with patient's caring attending.  Evaluation of confusion and disorientation per primary team.  Law Silva MD  11/5/2023  07:34 EST    Parts of this note may be an electronic transcription/translation of spoken language to printed text using the Dragon dictation system.

## 2023-11-05 NOTE — PROGRESS NOTES
[facility] Clinical Pharmacy Services: Vancomycin Level Monitoring Note    Misael Sharp is a 68 y.o. male who is on day 7 of pharmacy to dose vancomycin for Skin and Soft Tissue.    Estimated Creatinine Clearance: 87.9 mL/min (by C-G formula based on SCr of 0.91 mg/dL).    Current Vanc Dose: pulse dose based on levels                    Results from last 7 days   Lab Units 11/05/23  0647 11/04/23  0943 11/04/23  0738 11/01/23  0620   VANCOMYCIN RM mcg/mL 18.60 34.20  --   --    VANCOMYCIN TR mcg/mL  --   --  49.20* 17.70     PLAN: 48 hour level is 18.6, based on this no dose needed today, will recheck level in am and plan to re-dose tomorrow.    Next Level Date and Time: Vanc Random on 11/6 @ 0600    Bridgett Luz, PharmD  Clinical Pharmacist

## 2023-11-05 NOTE — PROGRESS NOTES
Name: Misael Sharp ADMIT: 10/30/2023   : 1955  PCP: Linus Martinez MD    MRN: [id number] LOS: 5 days   AGE/SEX: 68 y.o. male  ROOM: [facility]     Subjective   Subjective   Normal headache.  No focal neurological symptoms.  Positive sacral pain.  No seizures.  No dizziness.    Review of Systems  Cardiovascular/respiratory.  No chest pain/no shortness of breath/no palpitation/no cough   .  No dysuria or hematuria  GI.  No abdominal pain.  No nausea or vomiting.  Normal bowel habits     Objective   Objective   Vital Signs  Temp:  [97.5 °F (36.4 °C)-98.7 °F (37.1 °C)] 97.5 °F (36.4 °C)  Heart Rate:  [64-79] 64  Resp:  [16-18] 16  BP: ()/(62-82) 117/68  SpO2:  [94 %-98 %] 98 %  on  Flow (L/min):  [0.5-2] 0.5;   Device (Oxygen Therapy): nasal cannula    Intake/Output Summary (Last 24 hours) at 2023 1404  Last data filed at 2023 1306  Gross per 24 hour   Intake 1030 ml   Output 4050 ml   Net -3020 ml     Body mass index is 26.46 kg/m².      10/30/23  1551 23  0916   Weight: 79.3 kg (174 lb 12.8 oz) 78.9 kg (174 lb)     Physical Exam  General.  Middle-aged gentleman.  Appears older than stated age.  Alert and oriented x4.  No apparent pain/distress/diaphoresis.  Normal mood and affect.    Eyes.  Pupils equal round and reactive.  Intact extraocular musculature.  No pallor or jaundice.    Oral cavity.  Moist mucous membrane.  Neck.  Supple.  No JVD.  No lymphadenopathy or thyromegaly.  Cardiovascular.  Regular rate and rhythm with occasional ectopic beats and grade 2 systolic murmur.    Chest.  Clear to auscultation bilaterally with scattered bilateral rhonchi.  Abdomen.  Soft lax.  No tenderness.  No organomegaly.  No guarding or rebound.  Extremities.  No clubbing/cyanosis/edema.  CNS.  Weakness in all extremities.    Results Review:      Results from last 7 days   Lab Units 23  0647 23  0738 23  0659 23  0749 23  0620 10/31/23  0536 10/30/23  1520   SODIUM  "mmol/L 136 140 145 145 141 142 141   POTASSIUM mmol/L 4.0 4.0 4.0 4.3 3.3* 3.7 4.2   CHLORIDE mmol/L 100 105 109* 111* 107 107 104   CO2 mmol/L 28.0 28.1 30.4* 28.9 26.0 25.0 28.3   BUN mg/dL 23 10 8 8 8 10 13   CREATININE mg/dL 0.91 0.82 0.88 0.96 0.68* 0.64* 0.69*   GLUCOSE mg/dL 84 97 104* 128* 99 102* 96   CALCIUM mg/dL 9.2 9.2 9.3 8.8 8.9 8.8 9.3   AST (SGOT) U/L  --   --   --   --   --   --  28   ALT (SGPT) U/L  --   --   --   --   --   --  18     Estimated Creatinine Clearance: 87.9 mL/min (by C-G formula based on SCr of 0.91 mg/dL).      Results from last 7 days   Lab Units 11/03/23  0830 10/31/23  0641 10/31/23  0011   GLUCOSE mg/dL 119 105 116     Results from last 7 days   Lab Units 10/30/23  1731 10/30/23  1520   HSTROP T ng/L 38* 38*             Results from last 7 days   Lab Units 11/05/23  0647 11/04/23  0738 11/03/23  0659 11/02/23  0749 11/01/23  0620   MAGNESIUM mg/dL 3.5* 2.3 2.6* 2.4 2.6*   PHOSPHORUS mg/dL 4.4 5.1* 4.4 4.0 3.5           Invalid input(s): \"LDLCALC\"  Results from last 7 days   Lab Units 11/05/23  0647 11/04/23  0738 11/03/23  0659 11/02/23  0749 11/01/23  0620 10/31/23  0536 10/30/23  1520   WBC 10*3/mm3 11.72* 11.62* 10.73 9.15 8.17 11.38* 10.05   HEMOGLOBIN g/dL 8.6* 8.7* 8.9* 8.8* 9.2* 9.2* 10.3*   HEMATOCRIT % 27.2* 27.8* 28.5* 28.4* 28.8* 28.5* 32.5*   PLATELETS 10*3/mm3 360 339 368 370 336 316 300   MCV fL 85.3 88.3 89.3 88.5 87.3 86.4 87.8   MCH pg 27.0 27.6 27.9 27.4 27.9 27.9 27.8   MCHC g/dL 31.6 31.3* 31.2* 31.0* 31.9 32.3 31.7   RDW % 14.2 14.4 14.2 14.1 14.1 14.1 14.5   RDW-SD fl 44.1 46.0 46.1 45.2 44.9 43.9 46.5   MPV fL 9.6 9.1 9.5 9.4 9.9 9.9 10.0   NEUTROPHIL % % 61.3 64.9 66.3 62.8 63.8  --  61.9   LYMPHOCYTE % % 24.7 24.6 21.6 24.6 25.2  --  24.7   MONOCYTES % % 8.7 6.1 7.1 7.5 6.2  --  9.3   EOSINOPHIL % % 3.6 3.3 3.8 3.7 3.4  --  2.9   BASOPHIL % % 0.8 0.5 0.6 0.9 0.7  --  0.7   IMM GRAN % % 0.9* 0.6* 0.6* 0.5 0.7*  --  0.5   NEUTROS ABS 10*3/mm3 7.20* 7.54* " 7.12* 5.74 5.20  --  6.23   LYMPHS ABS 10*3/mm3 2.89 2.86 2.32 2.25 2.06  --  2.48   MONOS ABS 10*3/mm3 1.02* 0.71 0.76 0.69 0.51  --  0.93*   EOS ABS 10*3/mm3 0.42* 0.38 0.41* 0.34 0.28  --  0.29   BASOS ABS 10*3/mm3 0.09 0.06 0.06 0.08 0.06  --  0.07   IMMATURE GRANS (ABS) 10*3/mm3 0.10* 0.07* 0.06* 0.05 0.06*  --  0.05   NRBC /100 WBC 0.0 0.0 0.0 0.0 0.0  --  0.0             Results from last 7 days   Lab Units 10/30/23  1520   LACTATE mmol/L 1.0             Results from last 7 days   Lab Units 10/30/23  2101 10/30/23  2054 10/30/23  1547 10/30/23  1537   BLOODCX  No growth at 5 days No growth at 5 days  --   --    WOUNDCX   --   --   --  Light growth (2+) Proteus mirabilis*  Scant growth (1+) Escherichia coli*  Scant growth (1+) Staphylococcus aureus, MRSA*  Rare Proteus mirabilis*  Rare Streptococcus agalactiae (Group B)*  Rare Normal Skin Karlene  Rare Proteus mirabilis*  Rare Normal Skin Karlene  Rare Proteus mirabilis*  Rare Normal Skin Karlene   URINECX   --   --  >100,000 CFU/mL Mixed Karlene Isolated  --          Results from last 7 days   Lab Units 10/30/23  1547   NITRITE UA  Positive*   WBC UA /HPF Too Numerous to Count*   BACTERIA UA /HPF 3+*   SQUAM EPITHEL UA /HPF 0-2   URINECX  >100,000 CFU/mL Mixed Karlene Isolated           Imaging:  Imaging Results (Last 24 Hours)       Procedure Component Value Units Date/Time    MRI Brain Without Contrast [[id number]] Collected: 11/04/23 1152     Updated: 11/05/23 1112    Narrative:      MRI OF THE BRAIN WITHOUT CONTRAST ON 11/04/2023     CLINICAL HISTORY: Altered mental status.     TECHNIQUE: Axial T1, FLAIR, fat-suppressed T2, axial diffusion and  gradient echo T2 and sagittal T1-weighted images were obtained of the  entire head.     This is correlated to head CT yesterday morning on 11/03/2023 at 9:16  a.m. There are no additional prior studies for comparison.     FINDINGS: There is minimal T2 high signal in the periventricular white  matter several tiny  nodular foci in the subcortical white matter  consistent with very minimal small vessel disease. The remainder of the  brain parenchyma is normal in signal intensity. Specifically no  diffusion-weighted abnormality is seen with no acute infarct identified.  On the gradient echo T2 weighted images no acute or old blood breakdown  products are seen intracranially. The ventricles are normal in size. I  see no focal mass effect. There is no midline shift. No extra-axial  fluid collections are identified. The paranasal sinuses and the mastoid  air cells and the middle ear cavities are clear. Good flow voids are  demonstrated within the cerebral vessels and in the dural venous  sinuses. The orbits are normal in appearance. The calvarium and skull  base are normal in appearance. On the sagittal T1-weighted images, the  cervical spine is visualized down to the C4 cervical level and there has  been previous cervical spine surgery with a corpectomy at C4 and a  cylindrical graft extending from the inferior endplate of C4 to the  superior endplate of C5 and anterior plate and screw fixation spanning  from C3 inferiorly in the inferior aspect of the plate is not assessed.  There is abnormal thinning of the upper cervical spinal cord at the C3  and C4 levels suggesting myelomalacia in the cervical spinal cord.       Impression:      1. No acute intracranial abnormality is identified.  2. Other than very minimal small vessel disease in the cerebral white  matter, the remainder the MRI of the brain itself is normal with no  acute infarct seen.  3. Partial visualization of previous upper cervical spine surgery with  corpectomy at C4 and cylindrical graft extending from the inferior  endplate of C3 superior endplate of C5 anterior plate and screw fixation  from C3 extending inferiorly and the inferior aspect of the plate is not  assessed. There is abnormal thinning of the upper cervical spinal cord  at the C3 and C4 cervical levels  with indicating myelomalacia in the  cervical spinal cord. This could be more comprehensively assessed with a  dedicated cervical spine MRI if clinically desired. The remainder of the  MRI of the head is normal. The results were communicated to Dr. Odom from stroke neurology by telephone on 11/04/2023 at 11:30 a.m.     This report was finalized on 11/5/2023 11:09 AM by Dr. Huey Gillis M.D  on Workstation: [id number]                  I reviewed the patient's new clinical results / labs / tests / procedures      Assessment/Plan     Active Hospital Problems    Diagnosis  POA    **Altered mental status [R41.82]  Yes    Decubitus ulcer [L89.90]  Unknown    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           Metabolic encephalopathy/toxic encephalopathy leading to mentation changes that has resolved.  Metabolic encephalopathy is secondary to sacral decubitus soft tissue infection and osteomyelitis and toxic encephalopathy secondary to baclofen.  Normal mental status.  No acute CNS deficits.  MRI of the brain and CT scan of the brain are negative.  Neurology signed off and recommends no further work-up.  Sacral decubitus soft tissue infection and sacral osteomyelitis.  Polymicrobial infection with Streptococcus/MRSA/Proteus/E. coli.  Currently on IV Zosyn and IV vancomycin.  No fever.  Infectious disease following and recommends switching to p.o. Augmentin and p.o. Bactrim for a total of 2 weeks of antibiotics.  Blood cultures are negative.  Surgery does not recommend wound debridement at this time.  Spinal cord injury with quadriplegia  And bigeminy/elevated troponin.  No chest pain.  2D echo with normal ejection fraction and diastolic function.  Cardiology saw the patient recommends no further work-up.  EKG without acute ischemic changes  Anemia of chronic disease.  Hemoglobin stable  between 8.5 and 10.3.  Continue to observe.  VTE prophylaxis.  Patient on Eliquis.      Discussed my findings and plan of treatment with the patient  Disposition.  Back to skilled facility tomorrow.        Priscilla Fernandez MD  [facility]  11/05/23  14:04 EST

## 2023-11-05 NOTE — PLAN OF CARE
Problem: Adjustment to Illness (Sepsis/Septic Shock)  Goal: Optimal Coping  Outcome: Ongoing, Progressing  Intervention: Optimize Psychosocial Adjustment to Illness  Recent Flowsheet Documentation  Taken 11/4/2023 2000 by Mariann Sol RN  Supportive Measures: active listening utilized     Problem: Bleeding (Sepsis/Septic Shock)  Goal: Absence of Bleeding  Outcome: Ongoing, Progressing  Intervention: Monitor and Manage Bleeding  Recent Flowsheet Documentation  Taken 11/4/2023 2000 by Mariann Sol RN  Bleeding Precautions: blood pressure closely monitored     Problem: Glycemic Control Impaired (Sepsis/Septic Shock)  Goal: Blood Glucose Level Within Desired Range  Outcome: Ongoing, Progressing     Problem: Infection Progression (Sepsis/Septic Shock)  Goal: Absence of Infection Signs and Symptoms  Outcome: Ongoing, Progressing  Intervention: Initiate Sepsis Management  Recent Flowsheet Documentation  Taken 11/5/2023 0600 by Mariann Sol RN  Infection Prevention:   hand hygiene promoted   rest/sleep promoted  Isolation Precautions: precautions maintained  Taken 11/5/2023 0400 by Mariann Sol RN  Infection Prevention:   hand hygiene promoted   rest/sleep promoted  Isolation Precautions: precautions maintained  Taken 11/5/2023 0200 by Mariann Sol RN  Infection Prevention:   hand hygiene promoted   rest/sleep promoted  Isolation Precautions: precautions maintained  Taken 11/5/2023 0000 by Mariann Sol RN  Infection Prevention:   hand hygiene promoted   rest/sleep promoted  Isolation Precautions: precautions maintained  Taken 11/4/2023 2200 by Mariann Sol RN  Infection Prevention:   hand hygiene promoted   rest/sleep promoted  Isolation Precautions: precautions maintained  Taken 11/4/2023 2000 by Mariann Sol RN  Infection Prevention:   hand hygiene promoted   rest/sleep promoted  Isolation Precautions: precautions maintained  Intervention:  Promote Recovery  Recent Flowsheet Documentation  Taken 11/4/2023 2000 by Mariann Sol RN  Activity Management: bedrest  Intervention: Promote Stabilization  Recent Flowsheet Documentation  Taken 11/4/2023 2000 by Mariann Sol RN  Fluid/Electrolyte Management: fluids provided     Problem: Nutrition Impaired (Sepsis/Septic Shock)  Goal: Optimal Nutrition Intake  Outcome: Ongoing, Progressing     Problem: Skin Injury Risk Increased  Goal: Skin Health and Integrity  Outcome: Ongoing, Progressing  Intervention: Optimize Skin Protection  Recent Flowsheet Documentation  Taken 11/5/2023 0600 by Mariann Sol RN  Head of Bed (HOB) Positioning: HOB elevated  Taken 11/5/2023 0400 by Mariann Sol RN  Head of Bed (HOB) Positioning: HOB elevated  Taken 11/5/2023 0200 by Mariann Sol RN  Head of Bed (HOB) Positioning: HOB elevated  Taken 11/5/2023 0000 by Mariann Sol RN  Head of Bed (HOB) Positioning: HOB elevated  Taken 11/4/2023 2200 by Mariann Sol RN  Head of Bed (HOB) Positioning: HOB elevated  Taken 11/4/2023 2000 by Mariann Sol RN  Pressure Reduction Techniques: frequent weight shift encouraged  Head of Bed (HOB) Positioning: HOB elevated  Pressure Reduction Devices:   pressure-redistributing mattress utilized   specialty bed utilized  Skin Protection: adhesive use limited     Problem: Adult Inpatient Plan of Care  Goal: Plan of Care Review  Outcome: Ongoing, Progressing  Flowsheets (Taken 11/5/2023 0631)  Progress: improving  Plan of Care Reviewed With: patient  Goal: Patient-Specific Goal (Individualized)  Outcome: Ongoing, Progressing  Goal: Absence of Hospital-Acquired Illness or Injury  Outcome: Ongoing, Progressing  Intervention: Identify and Manage Fall Risk  Recent Flowsheet Documentation  Taken 11/5/2023 0600 by Mariann Sol RN  Safety Promotion/Fall Prevention: safety round/check completed  Taken 11/5/2023 0400 by Nubia  Alan, Mariann, RN  Safety Promotion/Fall Prevention: safety round/check completed  Taken 11/5/2023 0200 by Mariann Sol RN  Safety Promotion/Fall Prevention: safety round/check completed  Taken 11/5/2023 0000 by Mariann Sol RN  Safety Promotion/Fall Prevention: safety round/check completed  Taken 11/4/2023 2200 by Mariann Sol RN  Safety Promotion/Fall Prevention: safety round/check completed  Taken 11/4/2023 2000 by Mariann Sol RN  Safety Promotion/Fall Prevention: safety round/check completed  Intervention: Prevent Skin Injury  Recent Flowsheet Documentation  Taken 11/5/2023 0600 by Mariann Sol RN  Body Position: supine, legs elevated  Taken 11/5/2023 0400 by Mariann Sol RN  Body Position:   right   turned  Taken 11/5/2023 0200 by Mariann Sol RN  Body Position:   left   tilted   weight shifting  Taken 11/5/2023 0000 by Mariann Sol RN  Body Position:   right   weight shifting  Taken 11/4/2023 2200 by Mariann Sol RN  Body Position:   left   weight shifting  Taken 11/4/2023 2000 by Mariann Sol RN  Body Position:   right   weight shifting  Skin Protection: adhesive use limited  Intervention: Prevent and Manage VTE (Venous Thromboembolism) Risk  Recent Flowsheet Documentation  Taken 11/4/2023 2000 by Mariann Sol RN  Activity Management: bedrest  Range of Motion: ROM (range of motion) performed  Intervention: Prevent Infection  Recent Flowsheet Documentation  Taken 11/5/2023 0600 by Mariann Sol RN  Infection Prevention:   hand hygiene promoted   rest/sleep promoted  Taken 11/5/2023 0400 by Mariann Sol RN  Infection Prevention:   hand hygiene promoted   rest/sleep promoted  Taken 11/5/2023 0200 by Mariann Sol RN  Infection Prevention:   hand hygiene promoted   rest/sleep promoted  Taken 11/5/2023 0000 by Mariann Sol RN  Infection Prevention:   hand hygiene  promoted   rest/sleep promoted  Taken 11/4/2023 2200 by Mariann Sol RN  Infection Prevention:   hand hygiene promoted   rest/sleep promoted  Taken 11/4/2023 2000 by Mariann Sol RN  Infection Prevention:   hand hygiene promoted   rest/sleep promoted  Goal: Optimal Comfort and Wellbeing  Outcome: Ongoing, Progressing  Intervention: Monitor Pain and Promote Comfort  Recent Flowsheet Documentation  Taken 11/4/2023 2000 by Mariann Sol RN  Pain Management Interventions: see MAR  Goal: Readiness for Transition of Care  Outcome: Ongoing, Progressing     Problem: Fall Injury Risk  Goal: Absence of Fall and Fall-Related Injury  Outcome: Ongoing, Progressing  Intervention: Identify and Manage Contributors  Recent Flowsheet Documentation  Taken 11/5/2023 0600 by Mariann Sol RN  Medication Review/Management: medications reviewed  Taken 11/5/2023 0400 by Mariann Sol RN  Medication Review/Management: medications reviewed  Taken 11/5/2023 0200 by Mariann Sol RN  Medication Review/Management: medications reviewed  Taken 11/5/2023 0000 by Mariann Sol RN  Medication Review/Management: medications reviewed  Taken 11/4/2023 2200 by Mariann Sol RN  Medication Review/Management: medications reviewed  Taken 11/4/2023 2000 by Mariann Sol RN  Medication Review/Management: medications reviewed  Self-Care Promotion: safe use of adaptive equipment encouraged  Intervention: Promote Injury-Free Environment  Recent Flowsheet Documentation  Taken 11/5/2023 0600 by Mariann Sol RN  Safety Promotion/Fall Prevention: safety round/check completed  Taken 11/5/2023 0400 by Mariann Sol RN  Safety Promotion/Fall Prevention: safety round/check completed  Taken 11/5/2023 0200 by Mariann Sol RN  Safety Promotion/Fall Prevention: safety round/check completed  Taken 11/5/2023 0000 by Mariann Sol RN  Safety Promotion/Fall  Prevention: safety round/check completed  Taken 11/4/2023 2200 by Mariann Sol RN  Safety Promotion/Fall Prevention: safety round/check completed  Taken 11/4/2023 2000 by Mariann Sol RN  Safety Promotion/Fall Prevention: safety round/check completed     Problem: Asthma Comorbidity  Goal: Maintenance of Asthma Control  Outcome: Ongoing, Progressing  Intervention: Maintain Asthma Symptom Control  Recent Flowsheet Documentation  Taken 11/5/2023 0600 by Mariann Sol RN  Medication Review/Management: medications reviewed  Taken 11/5/2023 0400 by Mariann Sol RN  Medication Review/Management: medications reviewed  Taken 11/5/2023 0200 by Mariann Sol RN  Medication Review/Management: medications reviewed  Taken 11/5/2023 0000 by Mariann Sol RN  Medication Review/Management: medications reviewed  Taken 11/4/2023 2200 by Mariann Sol RN  Medication Review/Management: medications reviewed  Taken 11/4/2023 2000 by Mariann Sol RN  Medication Review/Management: medications reviewed     Problem: Behavioral Health Comorbidity  Goal: Maintenance of Behavioral Health Symptom Control  Outcome: Ongoing, Progressing  Intervention: Maintain Behavioral Health Symptom Control  Recent Flowsheet Documentation  Taken 11/5/2023 0600 by Mariann Sol RN  Medication Review/Management: medications reviewed  Taken 11/5/2023 0400 by Mariann Sol RN  Medication Review/Management: medications reviewed  Taken 11/5/2023 0200 by Mariann Sol RN  Medication Review/Management: medications reviewed  Taken 11/5/2023 0000 by Mariann Sol RN  Medication Review/Management: medications reviewed  Taken 11/4/2023 2200 by Mariann Sol RN  Medication Review/Management: medications reviewed  Taken 11/4/2023 2000 by Mariann Sol RN  Medication Review/Management: medications reviewed     Problem: COPD (Chronic Obstructive  Pulmonary Disease) Comorbidity  Goal: Maintenance of COPD Symptom Control  Outcome: Ongoing, Progressing  Intervention: Maintain COPD-Symptom Control  Recent Flowsheet Documentation  Taken 11/5/2023 0600 by Mariann Sol RN  Medication Review/Management: medications reviewed  Taken 11/5/2023 0400 by Mariann Sol RN  Medication Review/Management: medications reviewed  Taken 11/5/2023 0200 by Mariann Sol RN  Medication Review/Management: medications reviewed  Taken 11/5/2023 0000 by Mariann Sol RN  Medication Review/Management: medications reviewed  Taken 11/4/2023 2200 by Mariann Sol RN  Medication Review/Management: medications reviewed  Taken 11/4/2023 2000 by Mariann Sol RN  Supportive Measures: active listening utilized  Medication Review/Management: medications reviewed     Problem: Diabetes Comorbidity  Goal: Blood Glucose Level Within Targeted Range  Outcome: Ongoing, Progressing     Problem: Heart Failure Comorbidity  Goal: Maintenance of Heart Failure Symptom Control  Outcome: Ongoing, Progressing  Intervention: Maintain Heart Failure-Management  Recent Flowsheet Documentation  Taken 11/5/2023 0600 by Mariann Sol RN  Medication Review/Management: medications reviewed  Taken 11/5/2023 0400 by Mariann Sol RN  Medication Review/Management: medications reviewed  Taken 11/5/2023 0200 by Mariann Sol RN  Medication Review/Management: medications reviewed  Taken 11/5/2023 0000 by Mariann Sol RN  Medication Review/Management: medications reviewed  Taken 11/4/2023 2200 by Mariann Sol RN  Medication Review/Management: medications reviewed  Taken 11/4/2023 2000 by Mariann Sol RN  Medication Review/Management: medications reviewed     Problem: Hypertension Comorbidity  Goal: Blood Pressure in Desired Range  Outcome: Ongoing, Progressing  Intervention: Maintain Blood Pressure Management  Recent  Flowsheet Documentation  Taken 11/5/2023 0600 by Mariann Sol RN  Medication Review/Management: medications reviewed  Taken 11/5/2023 0400 by Mariann Sol RN  Medication Review/Management: medications reviewed  Taken 11/5/2023 0200 by Mariann Sol RN  Medication Review/Management: medications reviewed  Taken 11/5/2023 0000 by Mariann Sol RN  Medication Review/Management: medications reviewed  Taken 11/4/2023 2200 by Mariann Sol RN  Medication Review/Management: medications reviewed  Taken 11/4/2023 2000 by Mariann Sol RN  Medication Review/Management: medications reviewed     Problem: Obstructive Sleep Apnea Risk or Actual Comorbidity Management  Goal: Unobstructed Breathing During Sleep  Outcome: Ongoing, Progressing     Problem: Osteoarthritis Comorbidity  Goal: Maintenance of Osteoarthritis Symptom Control  Outcome: Ongoing, Progressing  Intervention: Maintain Osteoarthritis Symptom Control  Recent Flowsheet Documentation  Taken 11/5/2023 0600 by Mariann Sol RN  Medication Review/Management: medications reviewed  Taken 11/5/2023 0400 by Mariann Sol RN  Medication Review/Management: medications reviewed  Taken 11/5/2023 0200 by Mariann Sol RN  Medication Review/Management: medications reviewed  Taken 11/5/2023 0000 by Mariann Sol RN  Medication Review/Management: medications reviewed  Taken 11/4/2023 2200 by Mariann Sol RN  Medication Review/Management: medications reviewed  Taken 11/4/2023 2000 by Mariann Sol RN  Activity Management: bedrest  Medication Review/Management: medications reviewed     Problem: Pain Chronic (Persistent) (Comorbidity Management)  Goal: Acceptable Pain Control and Functional Ability  Outcome: Ongoing, Progressing  Intervention: Manage Persistent Pain  Recent Flowsheet Documentation  Taken 11/5/2023 0600 by Mariann Sol RN  Medication  Review/Management: medications reviewed  Taken 11/5/2023 0400 by Mariann Sol RN  Medication Review/Management: medications reviewed  Taken 11/5/2023 0200 by Mariann Sol RN  Medication Review/Management: medications reviewed  Taken 11/5/2023 0000 by Mariann Sol RN  Medication Review/Management: medications reviewed  Taken 11/4/2023 2200 by Mariann Sol RN  Medication Review/Management: medications reviewed  Taken 11/4/2023 2000 by Mariann Sol RN  Medication Review/Management: medications reviewed  Intervention: Develop Pain Management Plan  Recent Flowsheet Documentation  Taken 11/4/2023 2000 by Mariann Sol RN  Pain Management Interventions: see MAR  Intervention: Optimize Psychosocial Wellbeing  Recent Flowsheet Documentation  Taken 11/4/2023 2000 by Mariann Sol RN  Supportive Measures: active listening utilized  Diversional Activities: television     Problem: Seizure Disorder Comorbidity  Goal: Maintenance of Seizure Control  Outcome: Ongoing, Progressing  Intervention: Maintain Seizure-Symptom Control  Recent Flowsheet Documentation  Taken 11/4/2023 2000 by Mariann Sol RN  Seizure Precautions: activity supervised   Goal Outcome Evaluation:  Plan of Care Reviewed With: patient        Progress: improving

## 2023-11-06 VITALS
WEIGHT: 188.71 LBS | TEMPERATURE: 97.9 F | SYSTOLIC BLOOD PRESSURE: 102 MMHG | HEART RATE: 69 BPM | DIASTOLIC BLOOD PRESSURE: 73 MMHG | OXYGEN SATURATION: 95 % | RESPIRATION RATE: 18 BRPM | BODY MASS INDEX: 28.6 KG/M2 | HEIGHT: 68 IN

## 2023-11-06 PROBLEM — G93.40 ENCEPHALOPATHY: Status: RESOLVED | Noted: 2023-11-06 | Resolved: 2023-11-06

## 2023-11-06 PROBLEM — S14.109A UNSPECIFIED INJURY AT UNSPECIFIED LEVEL OF CERVICAL SPINAL CORD, INITIAL ENCOUNTER: Status: ACTIVE | Noted: 2023-11-06

## 2023-11-06 PROBLEM — R41.82 ALTERED MENTAL STATUS: Status: RESOLVED | Noted: 2023-10-30 | Resolved: 2023-11-06

## 2023-11-06 PROBLEM — G93.40 ENCEPHALOPATHY: Status: ACTIVE | Noted: 2023-11-06

## 2023-11-06 LAB
ALBUMIN SERPL-MCNC: 2.7 G/DL (ref 3.5–5.2)
ALP SERPL-CCNC: 106 U/L (ref 39–117)
ALT SERPL W P-5'-P-CCNC: 14 U/L (ref 1–41)
ANION GAP SERPL CALCULATED.3IONS-SCNC: 6.8 MMOL/L (ref 5–15)
AST SERPL-CCNC: 21 U/L (ref 1–40)
BASOPHILS # BLD AUTO: 0.07 10*3/MM3 (ref 0–0.2)
BASOPHILS NFR BLD AUTO: 0.7 % (ref 0–1.5)
BILIRUB CONJ SERPL-MCNC: <0.2 MG/DL (ref 0–0.3)
BILIRUB INDIRECT SERPL-MCNC: ABNORMAL MG/DL
BILIRUB SERPL-MCNC: <0.2 MG/DL (ref 0–1.2)
BUN SERPL-MCNC: 19 MG/DL (ref 8–23)
BUN/CREAT SERPL: 21.8 (ref 7–25)
CALCIUM SPEC-SCNC: 9.2 MG/DL (ref 8.6–10.5)
CHLORIDE SERPL-SCNC: 99 MMOL/L (ref 98–107)
CO2 SERPL-SCNC: 30.2 MMOL/L (ref 22–29)
CREAT SERPL-MCNC: 0.87 MG/DL (ref 0.76–1.27)
DEPRECATED RDW RBC AUTO: 44.2 FL (ref 37–54)
EGFRCR SERPLBLD CKD-EPI 2021: 94.6 ML/MIN/1.73
EOSINOPHIL # BLD AUTO: 0.55 10*3/MM3 (ref 0–0.4)
EOSINOPHIL NFR BLD AUTO: 5.2 % (ref 0.3–6.2)
ERYTHROCYTE [DISTWIDTH] IN BLOOD BY AUTOMATED COUNT: 14.1 % (ref 12.3–15.4)
GLUCOSE SERPL-MCNC: 93 MG/DL (ref 65–99)
HCT VFR BLD AUTO: 27.9 % (ref 37.5–51)
HGB BLD-MCNC: 8.9 G/DL (ref 13–17.7)
IMM GRANULOCYTES # BLD AUTO: 0.11 10*3/MM3 (ref 0–0.05)
IMM GRANULOCYTES NFR BLD AUTO: 1 % (ref 0–0.5)
LYMPHOCYTES # BLD AUTO: 3.05 10*3/MM3 (ref 0.7–3.1)
LYMPHOCYTES NFR BLD AUTO: 28.9 % (ref 19.6–45.3)
MCH RBC QN AUTO: 27.4 PG (ref 26.6–33)
MCHC RBC AUTO-ENTMCNC: 31.9 G/DL (ref 31.5–35.7)
MCV RBC AUTO: 85.8 FL (ref 79–97)
MONOCYTES # BLD AUTO: 0.92 10*3/MM3 (ref 0.1–0.9)
MONOCYTES NFR BLD AUTO: 8.7 % (ref 5–12)
NEUTROPHILS NFR BLD AUTO: 5.84 10*3/MM3 (ref 1.7–7)
NEUTROPHILS NFR BLD AUTO: 55.5 % (ref 42.7–76)
NRBC BLD AUTO-RTO: 0 /100 WBC (ref 0–0.2)
PLATELET # BLD AUTO: 345 10*3/MM3 (ref 140–450)
PMV BLD AUTO: 9.6 FL (ref 6–12)
POTASSIUM SERPL-SCNC: 3.8 MMOL/L (ref 3.5–5.2)
PROT SERPL-MCNC: 7.4 G/DL (ref 6–8.5)
RBC # BLD AUTO: 3.25 10*6/MM3 (ref 4.14–5.8)
SODIUM SERPL-SCNC: 136 MMOL/L (ref 136–145)
VANCOMYCIN SERPL-MCNC: 11.1 MCG/ML (ref 5–40)
WBC NRBC COR # BLD: 10.54 10*3/MM3 (ref 3.4–10.8)

## 2023-11-06 PROCEDURE — 80202 ASSAY OF VANCOMYCIN: CPT | Performed by: INTERNAL MEDICINE

## 2023-11-06 PROCEDURE — 25010000002 PIPERACILLIN SOD-TAZOBACTAM PER 1 G: Performed by: STUDENT IN AN ORGANIZED HEALTH CARE EDUCATION/TRAINING PROGRAM

## 2023-11-06 PROCEDURE — 80076 HEPATIC FUNCTION PANEL: CPT | Performed by: INTERNAL MEDICINE

## 2023-11-06 PROCEDURE — 85025 COMPLETE CBC W/AUTO DIFF WBC: CPT | Performed by: INTERNAL MEDICINE

## 2023-11-06 PROCEDURE — 25810000003 SODIUM CHLORIDE 0.9 % SOLUTION: Performed by: INTERNAL MEDICINE

## 2023-11-06 PROCEDURE — 80048 BASIC METABOLIC PNL TOTAL CA: CPT | Performed by: INTERNAL MEDICINE

## 2023-11-06 RX ORDER — BACLOFEN 10 MG/1
10 TABLET ORAL 3 TIMES DAILY
Qty: 90 TABLET | Refills: 3 | Status: SHIPPED | OUTPATIENT
Start: 2023-11-06

## 2023-11-06 RX ORDER — SODIUM HYPOCHLORITE 1.25 MG/ML
1 SOLUTION TOPICAL EVERY 12 HOURS
Qty: 300 ML | Refills: 3 | Status: SHIPPED | OUTPATIENT
Start: 2023-11-06

## 2023-11-06 RX ORDER — SULFAMETHOXAZOLE AND TRIMETHOPRIM 800; 160 MG/1; MG/1
1 TABLET ORAL 2 TIMES DAILY
Qty: 20 TABLET | Refills: 0 | Status: SHIPPED | OUTPATIENT
Start: 2023-11-06 | End: 2023-11-16

## 2023-11-06 RX ORDER — AMOXICILLIN AND CLAVULANATE POTASSIUM 875; 125 MG/1; MG/1
1 TABLET, FILM COATED ORAL 2 TIMES DAILY
Qty: 20 TABLET | Refills: 0 | Status: SHIPPED | OUTPATIENT
Start: 2023-11-06 | End: 2023-11-16

## 2023-11-06 RX ORDER — GABAPENTIN 100 MG/1
200 CAPSULE ORAL 3 TIMES DAILY
Qty: 12 CAPSULE | Refills: 0 | Status: SHIPPED | OUTPATIENT
Start: 2023-11-06

## 2023-11-06 RX ADMIN — SENNOSIDES 2 TABLET: 8.6 TABLET, FILM COATED ORAL at 08:26

## 2023-11-06 RX ADMIN — MULTIPLE VITAMINS W/ MINERALS TAB 1 TABLET: TAB at 08:25

## 2023-11-06 RX ADMIN — PIPERACILLIN SODIUM AND TAZOBACTAM SODIUM 3.38 G: 3; .375 INJECTION, SOLUTION INTRAVENOUS at 08:27

## 2023-11-06 RX ADMIN — Medication 2000 UNITS: at 08:25

## 2023-11-06 RX ADMIN — GABAPENTIN 200 MG: 100 CAPSULE ORAL at 08:25

## 2023-11-06 RX ADMIN — FUROSEMIDE 20 MG: 20 TABLET ORAL at 08:25

## 2023-11-06 RX ADMIN — DOCUSATE SODIUM 100 MG: 100 CAPSULE, LIQUID FILLED ORAL at 08:25

## 2023-11-06 RX ADMIN — SODIUM CHLORIDE 75 ML/HR: 9 INJECTION, SOLUTION INTRAVENOUS at 01:28

## 2023-11-06 RX ADMIN — BACLOFEN 10 MG: 10 TABLET ORAL at 08:26

## 2023-11-06 RX ADMIN — FOLIC ACID 2 MG: 1 TABLET ORAL at 08:26

## 2023-11-06 RX ADMIN — FERROUS SULFATE TAB 325 MG (65 MG ELEMENTAL FE) 325 MG: 325 (65 FE) TAB at 08:25

## 2023-11-06 RX ADMIN — OXYCODONE HYDROCHLORIDE AND ACETAMINOPHEN 1000 MG: 500 TABLET ORAL at 08:26

## 2023-11-06 RX ADMIN — APIXABAN 2.5 MG: 2.5 TABLET, FILM COATED ORAL at 08:25

## 2023-11-06 RX ADMIN — HYDROCODONE BITARTRATE AND ACETAMINOPHEN 1 TABLET: 5; 325 TABLET ORAL at 08:25

## 2023-11-06 RX ADMIN — DAKIN'S SOLUTION 0.125% (QUARTER STRENGTH): 0.12 SOLUTION at 06:47

## 2023-11-06 RX ADMIN — PIPERACILLIN SODIUM AND TAZOBACTAM SODIUM 3.38 G: 3; .375 INJECTION, SOLUTION INTRAVENOUS at 01:28

## 2023-11-06 RX ADMIN — Medication 220 MG: at 08:26

## 2023-11-06 RX ADMIN — SERTRALINE 50 MG: 50 TABLET, FILM COATED ORAL at 08:25

## 2023-11-06 RX ADMIN — Medication 100 MG: at 08:26

## 2023-11-06 NOTE — PROGRESS NOTES
Case Management Discharge Note      Final Note: Pt returning to his IC level bed at [facility] (Dorcas confirms bed) via [facility] scheduled at 1:00 PM. Pharmacy updated and packet provided to RN. Pt's [place] state guardian informed via secure email per her request ([email or web address]). Mago Fontanez LCSW         Selected Continued Care - Admitted Since 10/30/2023       Destination Coordination complete.      Service Provider Selected Services Address Phone Fax Patient Preferred    [facility] [address] [facility] [address] [phone] --              Durable Medical Equipment    No services have been selected for the patient.                Dialysis/Infusion    No services have been selected for the patient.                Home Medical Care    No services have been selected for the patient.                Therapy    No services have been selected for the patient.                Community Resources    No services have been selected for the patient.                Community & DME    No services have been selected for the patient.                    Transportation Services  Ambulance: [facility]    Final Discharge Disposition Code: 04 - intermediate care facility

## 2023-11-06 NOTE — PROGRESS NOTES
"  Infectious Diseases Progress Note    Law Silva MD     [facility]  Los: 6 days  Patient Identification:  Name: Misael Sharp  Age: 68 y.o.  Sex: male  :  1955  MRN: [id number]         Primary Care Physician: Linus Martinez MD        Subjective: Feeling better denies any specific complaints.  Interval History: See consultation note.    Objective:    Scheduled Meds:apixaban, 2.5 mg, Oral, BID  ascorbic acid, 1,000 mg, Oral, Daily  atorvastatin, 10 mg, Oral, Nightly  baclofen, 10 mg, Oral, TID  cholecalciferol, 2,000 Units, Oral, Daily  docusate sodium, 100 mg, Oral, Daily  ferrous sulfate, 325 mg, Oral, Daily With Breakfast  folic acid, 2 mg, Oral, Daily  furosemide, 20 mg, Oral, Daily  gabapentin, 200 mg, Oral, TID  multivitamin with minerals, 1 tablet, Oral, Daily  piperacillin-tazobactam, 3.375 g, Intravenous, Q8H  senna, 2 tablet, Oral, BID  sertraline, 50 mg, Oral, Daily  sodium hypochlorite, , Topical, Q12H  thiamine, 100 mg, Oral, Daily  Vancomycin Pharmacy Intermittent/Pulse Dosing, , Does not apply, Daily  zinc sulfate, 220 mg, Oral, Daily      Continuous Infusions:Pharmacy to dose vancomycin,   sodium chloride, 75 mL/hr, Last Rate: 75 mL/hr (23 0128)        Vital signs in last 24 hours:  Temp:  [97.2 °F (36.2 °C)-98.1 °F (36.7 °C)] 98.1 °F (36.7 °C)  Heart Rate:  [64-74] 74  Resp:  [16-18] 16  BP: ()/(66-78) 106/66    Intake/Output:    Intake/Output Summary (Last 24 hours) at 2023 0717  Last data filed at 2023 0600  Gross per 24 hour   Intake 1030 ml   Output 3600 ml   Net -2570 ml       Exam:  /66 (BP Location: Right arm, Patient Position: Lying)   Pulse 74   Temp 98.1 °F (36.7 °C) (Oral)   Resp 16   Ht 172.7 cm (68\")   Wt 85.6 kg (188 lb 11.4 oz)   SpO2 95%   BMI 28.69 kg/m²   Patient is examined using the personal protective equipment as per guidelines from infection control for this particular patient as enacted.  Hand washing was performed " before and after patient interaction.  General Appearance:    Alert, cooperative, no distress, AAOx3                          Head:    Normocephalic, without obvious abnormality, atraumatic                           Eyes:    PERRL, conjunctivae/corneas clear, EOM's intact, both eyes                         Throat:   Lips, tongue, gums normal; oral mucosa pink and moist                           Neck:   Supple, symmetrical, trachea midline, no JVD                         Lungs:    Clear to auscultation bilaterally, respirations unlabored                 Chest Wall:    No tenderness or deformity                          Heart:  S1-S2 tachycardia                  Abdomen:   Soft nontender                 Extremities: Disuse atrophy.                        Pulses:   Pulses palpable in all extremities                            Skin:                               Neurologic: Neurological deficits due to spinal cord injury and plegia noted.       Data Review:    I reviewed the patient's new clinical results.  Results from last 7 days   Lab Units 11/06/23  0635 11/05/23  0647 11/04/23  0738 11/03/23  0659 11/02/23  0749 11/01/23  0620 10/31/23  0536   WBC 10*3/mm3 10.54 11.72* 11.62* 10.73 9.15 8.17 11.38*   HEMOGLOBIN g/dL 8.9* 8.6* 8.7* 8.9* 8.8* 9.2* 9.2*   PLATELETS 10*3/mm3 345 360 339 368 370 336 316     Results from last 7 days   Lab Units 11/05/23  0647 11/04/23  0738 11/03/23  0659 11/02/23  0749 11/01/23  0620 10/31/23  0536 10/30/23  1520   SODIUM mmol/L 136 140 145 145 141 142 141   POTASSIUM mmol/L 4.0 4.0 4.0 4.3 3.3* 3.7 4.2   CHLORIDE mmol/L 100 105 109* 111* 107 107 104   CO2 mmol/L 28.0 28.1 30.4* 28.9 26.0 25.0 28.3   BUN mg/dL 23 10 8 8 8 10 13   CREATININE mg/dL 0.91 0.82 0.88 0.96 0.68* 0.64* 0.69*   CALCIUM mg/dL 9.2 9.2 9.3 8.8 8.9 8.8 9.3   GLUCOSE mg/dL 84 97 104* 128* 99 102* 96     Microbiology Results (last 10 days)       Procedure Component Value - Date/Time    CANDIDA AURIS SCREEN - Swab,  Axilla Right, Axilla Left and Groin [[id number]]  (Normal) Collected: 10/31/23 1025    Lab Status: Final result Specimen: Swab from Axilla Right, Axilla Left and Groin Updated: 11/05/23 1000     Candida Auris Screen Culture No Candida auris isolated at 5 days    Blood Culture - Blood, Arm, Left [[id number]]  (Normal) Collected: 10/30/23 2101    Lab Status: Final result Specimen: Blood from Arm, Left Updated: 11/04/23 2115     Blood Culture No growth at 5 days    Narrative:      Less than seven (7) mL's of blood was collected.  Insufficient quantity may yield false negative results.    Blood Culture - Blood, Arm, Right [[id number]]  (Normal) Collected: 10/30/23 2054    Lab Status: Final result Specimen: Blood from Arm, Right Updated: 11/04/23 2115     Blood Culture No growth at 5 days    Narrative:      Less than seven (7) mL's of blood was collected.  Insufficient quantity may yield false negative results.    Urine Culture - Urine, Urine, Catheter [[id number]] Collected: 10/30/23 1547    Lab Status: Final result Specimen: Urine, Catheter Updated: 10/31/23 1243     Urine Culture >100,000 CFU/mL Mixed Karlene Isolated    Narrative:      Specimen contains mixed organisms of questionable pathogenicity suggestive of contamination. If symptoms persist, suggest recollection.  Colonization of the urinary tract without infection is common. Treatment is discouraged unless the patient is symptomatic, pregnant, or undergoing an invasive urologic procedure.    Wound Culture - Wound, Foot, Right [[id number]]  (Abnormal) Collected: 10/30/23 1537    Lab Status: Final result Specimen: Wound from Foot, Right Updated: 11/02/23 0729     Wound Culture Rare Proteus mirabilis     Comment: Refer to previous wound culture collected on 10/30/2023 1537 for MICs.           Rare Streptococcus agalactiae (Group B)     Comment:   This organism is considered to be universally susceptible to penicillin.  No further antibiotic testing will be performed. If  Clindamycin or Erythromycin is the drug of choice, notify the laboratory within 7 days to request susceptibility testing.         Rare Normal Skin Karlene     Gram Stain Moderate (3+) WBCs per low power field      Few (2+) Gram positive cocci    Narrative:              Wound Culture - Wound, Spine, Sacral [[id number]]  (Abnormal)  (Susceptibility) Collected: 10/30/23 9410    Lab Status: Final result Specimen: Wound from Spine, Sacral Updated: 11/04/23 1005     Wound Culture Light growth (2+) Proteus mirabilis      Scant growth (1+) Escherichia coli      Scant growth (1+) Staphylococcus aureus, MRSA     Comment:   Methicillin resistant Staphylococcus aureus, Patient may be an isolation risk.        Gram Stain Rare (1+) WBCs per low power field      Many (4+) Gram positive cocci    Susceptibility        Proteus mirabilis      CHAD      Ampicillin Resistant      Ampicillin + Sulbactam Susceptible      Cefepime Susceptible      Ceftazidime Susceptible      Ceftriaxone Susceptible      Gentamicin Susceptible      Levofloxacin Resistant      Piperacillin + Tazobactam Susceptible      Tetracycline Resistant      Trimethoprim + Sulfamethoxazole Resistant                       Susceptibility        Escherichia coli      CHAD      Ampicillin Susceptible      Ampicillin + Sulbactam Susceptible      Cefepime Susceptible      Ceftazidime Susceptible      Ceftriaxone Susceptible      Gentamicin Susceptible      Levofloxacin Susceptible      Piperacillin + Tazobactam Susceptible      Tetracycline Susceptible      Trimethoprim + Sulfamethoxazole Susceptible                       Susceptibility        Staphylococcus aureus, MRSA      CHAD      Clindamycin Resistant      Erythromycin Resistant      Inducible Clindamycin Resistance Negative      Oxacillin Resistant      Rifampin Susceptible      Tetracycline Susceptible      Trimethoprim + Sulfamethoxazole Susceptible      Vancomycin Susceptible                       Susceptibility Comments        Proteus mirabilis    Cefazolin sensitivity will not be reported for Enterobacteriaceae in non-urine isolates. If cefazolin is preferred, please call the microbiology lab to request an E-test.  With the exception of urinary-sourced infections, aminoglycosides should not be used as monotherapy.      Escherichia coli    Cefazolin sensitivity will not be reported for Enterobacteriaceae in non-urine isolates. If cefazolin is preferred, please call the microbiology lab to request an E-test.  With the exception of urinary-sourced infections, aminoglycosides should not be used as monotherapy.               Wound Culture - Wound, Spine, Sacral [[id number]]  (Abnormal) Collected: 10/30/23 1537    Lab Status: Final result Specimen: Wound from Spine, Sacral Updated: 11/02/23 0726     Wound Culture Rare Proteus mirabilis      Rare Normal Skin Karlene     Gram Stain Rare (1+) WBCs per low power field      Rare (1+) Gram positive cocci    Narrative:      Refer to previous wound culture collected on 10/30/2023 1537 for MICs        Wound Culture - Wound, Spine, Sacral [[id number]]  (Abnormal) Collected: 10/30/23 1537    Lab Status: Final result Specimen: Wound from Spine, Sacral Updated: 11/02/23 0726     Wound Culture Rare Proteus mirabilis      Rare Normal Skin Karlene     Gram Stain Few (2+) WBCs per low power field      No organisms seen    Narrative:      Refer to previous wound culture collected on 10/30/2023 1537 for MICs                Assessment:    Altered mental status    Decubitus ulcer    Sacral osteomyelitis    Anemia    Elevated troponin  1-metabolic encephalopathy due to  2-progressively infected sacrococcygeal/left ischial decubitus ulcer with contiguous focus osteomyelitis with mixed infection culture positive for Proteus and E. coli and gram-positive cocci seen in the Gram stain  3-immobility and decubitus ulceration due to spinal cord injury  4-Limited database  5-neurogenic bladder with indwelling catheter in  place  6-multiple skin pressure ulcers at different sites.    7-confusion and disorientation-etiology unclear     Recommendations/Discussions:  His confusion is concerning.  Continue vancomycin and Zosyn while he is here and when he is considered ready to be discharged can be switched to oral Augmentin and Bactrim to complete 10 to 14 days of treatment for soft tissue infection understanding that prolonged antibiotic therapy without correcting the underlying cause resulting in decubitus ulceration would not translate into sustained success but would definitely buy him significant antibiotic toxicity with continued progression of underlying osteomyelitis.  This concept of care of treating episodes of systemic illness due to soft tissue infection with assessment for need for debridement discussed with patient's caring attending.  Evaluation of confusion and disorientation per primary team.  Law Silva MD  11/6/2023  07:17 EST    Parts of this note may be an electronic transcription/translation of spoken language to printed text using the Dragon dictation system.

## 2023-11-06 NOTE — DISCHARGE SUMMARY
Patient Name: Misael Sharp  : 1955  MRN: [id number]    Date of Admission: 10/30/2023  Date of Discharge:  2023  Primary Care Physician: Linus Martinez MD      Discharge Diagnoses     Active Hospital Problems    Diagnosis  POA    Unspecified injury at unspecified level of cervical spinal cord, initial encounter [S14.109A]  Yes    Decubitus ulcer [L89.90]  Yes    Sacral osteomyelitis [M46.28]  Yes    Anemia [D64.9]  Yes    Elevated troponin [R79.89]  Yes      Resolved Hospital Problems    Diagnosis Date Resolved POA    **Altered mental status [R41.82] 2023 Yes    Encephalopathy [G93.40] 2023 Yes        Hospital Course     Brief admission history and physical.  Please refer to the H&P for full.  A pleasant 68 years old gentleman with a past history of spinal cord injury and quadriplegia/chronic sacral decubitus who presented to the emergency department from his skilled facility because of decreased level of consciousness and weakness.  Physical examination admission included a temperature of 98.1 a pulse of 75 blood pressure 117/68 respiratory rate of 18 and an O2 sats of 99% on room air.  Chronically ill-appearing gentleman.  Quadriplegia.  Grade 3 sacral decubitus.  Patient was admitted to the hospital with decreased level of consciousness secondary to metabolic and toxic encephalopathy.  The metabolic encephalopathy was secondary to sacral decubitus infection and osteomyelitis and the toxic encephalopathy secondary to baclofen and Neurontin.  After treatment of the infection of the sacral decubitus and decreasing baclofen and Neurontin his mentation has returned back to his baseline.  At the time of discharge he was alert and oriented x4.  MRI and CT scan of the brain were negative.  Neurology did not recommend any further work-up.  As far as his sacral decubitus he was diagnosed with a soft tissue infection and sacral osteomyelitis.  This was a polymicrobial infection with  Streptococcus/MRSA/Proteus/E. coli.  ID consult was obtained and he was started on IV Zosyn and IV vancomycin.  Surgery consult did not recommend any wound debridement.  Local care of the wound was initiated.  ID recommended switching to p.o. Augmentin and Bactrim at the time of discharge for 10 days understanding that the chronic osteomyelitis is going to be persistent.  Patient will be referred to a wound clinic and will continue local care with [facility].  He has a spinal cord injury with quadriplegia and a indwelling Villela catheter and the Villela catheter was changed during this admission and this remained stable.  He developed bigeminy and elevated troponin.  2D echo revealed a normal ejection fraction and diastolic function.  Cardiology saw the patient and recommended no further work-up.  EKG was nonischemic.  He was noted to be anemic and this was thought to be anemia of chronic disease.  Hemoglobin remained stable between 8.5 and 10.3.  He remained on Eliquis for his VTE prophylaxis.  At the time of discharge he was alert and oriented and hemodynamically stable.  Follow-up with primary MD/ID/wound MD.  Consultants     Consult Orders (all) (From admission, onward)       Start     Ordered    11/03/23 0830  Inpatient Neurology Consult General  Once        Specialty:  Neurology  Provider:  Jorge Luis Charles MD    11/03/23 0830    11/01/23 1513  Inpatient Cardiology Consult  Once        Specialty:  Cardiology  Provider:  Jovanni Jackman III, MD    11/01/23 1512    10/31/23 1713  Inpatient Infectious Diseases Consult  Once        Specialty:  Infectious Diseases  Provider:  Law Silva MD    10/31/23 1712    10/31/23 1710  Inpatient General Surgery Consult  Once        Specialty:  General Surgery  Provider:  Pako Quiroz MD    10/31/23 1710    10/30/23 1701  [facility] (on-call MD unless specified) Details  Once        Specialty:  Hospitalist  Provider:  Antionette Rodriguez MD    10/30/23 1700                   Procedures     Imaging Results (All)       Procedure Component Value Units Date/Time    MRI Brain Without Contrast [[id number]] Collected: 11/04/23 1152     Updated: 11/05/23 1112    Narrative:      MRI OF THE BRAIN WITHOUT CONTRAST ON 11/04/2023     CLINICAL HISTORY: Altered mental status.     TECHNIQUE: Axial T1, FLAIR, fat-suppressed T2, axial diffusion and  gradient echo T2 and sagittal T1-weighted images were obtained of the  entire head.     This is correlated to head CT yesterday morning on 11/03/2023 at 9:16  a.m. There are no additional prior studies for comparison.     FINDINGS: There is minimal T2 high signal in the periventricular white  matter several tiny nodular foci in the subcortical white matter  consistent with very minimal small vessel disease. The remainder of the  brain parenchyma is normal in signal intensity. Specifically no  diffusion-weighted abnormality is seen with no acute infarct identified.  On the gradient echo T2 weighted images no acute or old blood breakdown  products are seen intracranially. The ventricles are normal in size. I  see no focal mass effect. There is no midline shift. No extra-axial  fluid collections are identified. The paranasal sinuses and the mastoid  air cells and the middle ear cavities are clear. Good flow voids are  demonstrated within the cerebral vessels and in the dural venous  sinuses. The orbits are normal in appearance. The calvarium and skull  base are normal in appearance. On the sagittal T1-weighted images, the  cervical spine is visualized down to the C4 cervical level and there has  been previous cervical spine surgery with a corpectomy at C4 and a  cylindrical graft extending from the inferior endplate of C4 to the  superior endplate of C5 and anterior plate and screw fixation spanning  from C3 inferiorly in the inferior aspect of the plate is not assessed.  There is abnormal thinning of the upper cervical spinal cord at the C3  and C4 levels  suggesting myelomalacia in the cervical spinal cord.       Impression:      1. No acute intracranial abnormality is identified.  2. Other than very minimal small vessel disease in the cerebral white  matter, the remainder the MRI of the brain itself is normal with no  acute infarct seen.  3. Partial visualization of previous upper cervical spine surgery with  corpectomy at C4 and cylindrical graft extending from the inferior  endplate of C3 superior endplate of C5 anterior plate and screw fixation  from C3 extending inferiorly and the inferior aspect of the plate is not  assessed. There is abnormal thinning of the upper cervical spinal cord  at the C3 and C4 cervical levels with indicating myelomalacia in the  cervical spinal cord. This could be more comprehensively assessed with a  dedicated cervical spine MRI if clinically desired. The remainder of the  MRI of the head is normal. The results were communicated to Dr. Odom from stroke neurology by telephone on 11/04/2023 at 11:30 a.m.     This report was finalized on 11/5/2023 11:09 AM by Dr. Huey Gillis M.D  on Workstation: [id number]       CT Head Without Contrast [[id number]] Collected: 11/03/23 0940     Updated: 11/03/23 1652    Narrative:      CT HEAD WITHOUT CONTRAST     HISTORY: Confusion, disoriented.     COMPARISON: CT head 10/30/2023.     FINDINGS: The brain and ventricles are symmetrical. There is no evidence  of hemorrhage, hydrocephalus or of abnormal extra-axial fluid. No focal  area of decreased attenuation to suggest acute infarction is identified.  Further evaluation could be performed with a MRI examination of the  brain as indicated.     The above information was called to the patient's nurse at the time of  the dictation.           Radiation dose reduction techniques were utilized, including automated  exposure control and exposure modulation based on body size.        This report was finalized on 11/3/2023 4:49 PM by Dr. Hai Rees,  M.D  on Workstation: [id number]       CT Head Without Contrast [[id number]] Collected: 10/30/23 1652     Updated: 10/30/23 2037    Narrative:      CT HEAD WITHOUT CONTRAST     HISTORY:  Confusion.     COMPARISON: None.     FINDINGS: The brain and ventricles are symmetrical. There is no evidence  of hemorrhage, hydrocephalus or of abnormal extra-axial fluid. No focal  area of decreased attenuation to suggest acute infarction is identified.       Impression:      No acute process is identified. Further evaluation could be  performed with MRI examination of the brain as indicated.           Radiation dose reduction techniques were utilized, including automated  exposure control and exposure modulation based on body size.        This report was finalized on 10/30/2023 8:34 PM by Dr. Hai Rees M.D on Workstation: [id number]       CT Pelvis Without Contrast [[id number]] Collected: 10/30/23 1700     Updated: 10/30/23 1705    Narrative:      CT PELVIS WO CONTRAST-     INDICATIONS: Sacral decubitus ulcer. Radiation dose reduction techniques  were utilized, including automated exposure control and exposure  modulation based on body size.     TECHNIQUE: NONCONTRAST PELVIS CT     COMPARISON: None available     FINDINGS:     Sacral decubitus ulcer is apparent, subcutaneous phlegmonous change, and  small foci of soft tissue gas that may be evidence of developing  abscess, for example axial image 77, follow-up advised. Erosion of the  coccyx and distal sacrum and posterior left ischium is noted, compatible  with osteomyelitis. No acute fractures are noted.     Urinary bladder is catheterized. Gas in the urinary bladder may be from  catheterization or may be evidence of urinary infection. Likewise,  bladder wall thickening may be from lack of distention and/or infection.     Scattered arterial calcifications are present.          Impression:         As described.           This report was finalized on 10/30/2023 5:02 PM by   Chiki Pepper M.D on Workstation: [id number]       XR Chest 1 View [[id number]] Collected: 10/30/23 1614     Updated: 10/30/23 1618    Narrative:      XR CHEST 1 VW-10/30/2023     HISTORY: Altered mental status.     Heart size is at the upper limits of normal. There is elevation of the  left hemidiaphragm. Lungs appear clear. Cervical fusion plate is seen.  Bones and soft tissues are otherwise unremarkable.       Impression:      1. Borderline cardiomegaly.  2. Elevated left hemidiaphragm.        This report was finalized on 10/30/2023 4:15 PM by Dr. Willie Sosa M.D on Workstation: [id number]               Pertinent Labs     Results from last 7 days   Lab Units 11/06/23  0635 11/05/23  0647 11/04/23  0738 11/03/23  0659   WBC 10*3/mm3 10.54 11.72* 11.62* 10.73   HEMOGLOBIN g/dL 8.9* 8.6* 8.7* 8.9*   PLATELETS 10*3/mm3 345 360 339 368     Results from last 7 days   Lab Units 11/06/23  0634 11/05/23  0647 11/04/23  0738 11/03/23  0659   SODIUM mmol/L 136 136 140 145   POTASSIUM mmol/L 3.8 4.0 4.0 4.0   CHLORIDE mmol/L 99 100 105 109*   CO2 mmol/L 30.2* 28.0 28.1 30.4*   BUN mg/dL 19 23 10 8   CREATININE mg/dL 0.87 0.91 0.82 0.88   GLUCOSE mg/dL 93 84 97 104*   Estimated Creatinine Clearance: 87.8 mL/min (by C-G formula based on SCr of 0.87 mg/dL).  Results from last 7 days   Lab Units 11/06/23  0634 10/30/23  1520   ALBUMIN g/dL 2.7* 2.9*   BILIRUBIN mg/dL <0.2 0.4   ALK PHOS U/L 106 126*   AST (SGOT) U/L 21 28   ALT (SGPT) U/L 14 18     Results from last 7 days   Lab Units 11/06/23  0634 11/05/23  0647 11/04/23  0738 11/03/23  0659 11/02/23  0749 10/31/23  0536 10/30/23  1520   CALCIUM mg/dL 9.2 9.2 9.2 9.3 8.8   < > 9.3   ALBUMIN g/dL 2.7*  --   --   --   --   --  2.9*   MAGNESIUM mg/dL  --  3.5* 2.3 2.6* 2.4   < >  --    PHOSPHORUS mg/dL  --  4.4 5.1* 4.4 4.0   < >  --     < > = values in this interval not displayed.       Results from last 7 days   Lab Units 10/30/23  1731 10/30/23  1520   HSTROP T ng/L  "38* 38*           Invalid input(s): \"LDLCALC\"  Results from last 7 days   Lab Units 10/30/23  2101 10/30/23  2054 10/30/23  1547 10/30/23  1537   BLOODCX  No growth at 5 days No growth at 5 days  --   --    WOUNDCX   --   --   --  Light growth (2+) Proteus mirabilis*  Scant growth (1+) Escherichia coli*  Scant growth (1+) Staphylococcus aureus, MRSA*  Rare Proteus mirabilis*  Rare Streptococcus agalactiae (Group B)*  Rare Normal Skin Karlene  Rare Proteus mirabilis*  Rare Normal Skin Karlene  Rare Proteus mirabilis*  Rare Normal Skin Karlene   URINECX   --   --  >100,000 CFU/mL Mixed Karlene Isolated  --      Imaging Results (Last 24 Hours)       Procedure Component Value Units Date/Time    MRI Brain Without Contrast [[id number]] Collected: 11/04/23 1152     Updated: 11/05/23 1112    Narrative:      MRI OF THE BRAIN WITHOUT CONTRAST ON 11/04/2023     CLINICAL HISTORY: Altered mental status.     TECHNIQUE: Axial T1, FLAIR, fat-suppressed T2, axial diffusion and  gradient echo T2 and sagittal T1-weighted images were obtained of the  entire head.     This is correlated to head CT yesterday morning on 11/03/2023 at 9:16  a.m. There are no additional prior studies for comparison.     FINDINGS: There is minimal T2 high signal in the periventricular white  matter several tiny nodular foci in the subcortical white matter  consistent with very minimal small vessel disease. The remainder of the  brain parenchyma is normal in signal intensity. Specifically no  diffusion-weighted abnormality is seen with no acute infarct identified.  On the gradient echo T2 weighted images no acute or old blood breakdown  products are seen intracranially. The ventricles are normal in size. I  see no focal mass effect. There is no midline shift. No extra-axial  fluid collections are identified. The paranasal sinuses and the mastoid  air cells and the middle ear cavities are clear. Good flow voids are  demonstrated within the cerebral vessels and " in the dural venous  sinuses. The orbits are normal in appearance. The calvarium and skull  base are normal in appearance. On the sagittal T1-weighted images, the  cervical spine is visualized down to the C4 cervical level and there has  been previous cervical spine surgery with a corpectomy at C4 and a  cylindrical graft extending from the inferior endplate of C4 to the  superior endplate of C5 and anterior plate and screw fixation spanning  from C3 inferiorly in the inferior aspect of the plate is not assessed.  There is abnormal thinning of the upper cervical spinal cord at the C3  and C4 levels suggesting myelomalacia in the cervical spinal cord.       Impression:      1. No acute intracranial abnormality is identified.  2. Other than very minimal small vessel disease in the cerebral white  matter, the remainder the MRI of the brain itself is normal with no  acute infarct seen.  3. Partial visualization of previous upper cervical spine surgery with  corpectomy at C4 and cylindrical graft extending from the inferior  endplate of C3 superior endplate of C5 anterior plate and screw fixation  from C3 extending inferiorly and the inferior aspect of the plate is not  assessed. There is abnormal thinning of the upper cervical spinal cord  at the C3 and C4 cervical levels with indicating myelomalacia in the  cervical spinal cord. This could be more comprehensively assessed with a  dedicated cervical spine MRI if clinically desired. The remainder of the  MRI of the head is normal. The results were communicated to Dr. Odom from stroke neurology by telephone on 11/04/2023 at 11:30 a.m.     This report was finalized on 11/5/2023 11:09 AM by Dr. Huey Gillis M.D  on Workstation: [id number]               Test Results Pending at Discharge         Discharge Exam   Physical Exam  Vitals.  Temperature 97.9 a pulse of 74 respirate rate of 18 blood pressure 108/73 and O2 sats of 95% on room  General.  Middle-aged gentleman.   Appears older than stated age.  Alert and oriented x4.  No apparent pain/distress/diaphoresis.  Normal mood and affect.    Eyes.  Pupils equal round and reactive.  Intact extraocular musculature.  No pallor or jaundice.    Oral cavity.  Moist mucous membrane.  Neck.  Supple.  No JVD.  No lymphadenopathy or thyromegaly.  Cardiovascular.  Regular rate and rhythm with occasional ectopic beats and grade 2 systolic murmur.    Chest.  Clear to auscultation bilaterally with no added sounds.  Abdomen.  Soft lax.  No tenderness.  No organomegaly.  No guarding or rebound.  Extremities.  No clubbing/cyanosis/edema.  CNS.  Weakness in all extremities.  Discharge Details        Discharge Medications        New Medications        Instructions Start Date   amoxicillin-clavulanate 875-125 MG per tablet  Commonly known as: AUGMENTIN   1 tablet, Oral, 2 Times Daily      sulfamethoxazole-trimethoprim 800-160 MG per tablet  Commonly known as: Bactrim DS   1 tablet, Oral, 2 Times Daily             Changes to Medications        Instructions Start Date   baclofen 10 MG tablet  Commonly known as: LIORESAL  What changed:   medication strength  how much to take   10 mg, Oral, 3 Times Daily      gabapentin 100 MG capsule  Commonly known as: NEURONTIN  What changed: how much to take   200 mg, Oral, 3 Times Daily      sodium hypochlorite 0.25 % solution topical solution  Generic drug: sodium hypochlorite  What changed: Another medication with the same name was added. Make sure you understand how and when to take each.   1 application , Topical, Every 12 Hours Scheduled, To R heel      sodium hypochlorite 0.125 % solution topical solution 0.125%  Commonly known as: DAKIN'S 1/4 STRENGTH  What changed: You were already taking a medication with the same name, and this prescription was added. Make sure you understand how and when to take each.   473 mL, Topical, Every 12 Hours             Continue These Medications        Instructions Start Date    acetaminophen 325 MG tablet  Commonly known as: TYLENOL   650 mg, Oral, Every 8 Hours PRN      apixaban 2.5 MG tablet tablet  Commonly known as: ELIQUIS   2.5 mg, Oral, 2 Times Daily      ascorbic acid 500 MG tablet  Commonly known as: VITAMIN C   1,000 mg, Oral, Daily      atorvastatin 10 MG tablet  Commonly known as: LIPITOR   10 mg, Oral, Nightly      bisacodyl 10 MG suppository  Commonly known as: DULCOLAX   10 mg, Rectal, Daily PRN      cholecalciferol 25 MCG (1000 UT) tablet  Commonly known as: VITAMIN D3   2,000 Units, Oral, Daily      docusate sodium 100 MG capsule  Commonly known as: COLACE   100 mg, Oral, Daily      ferrous gluconate 240 (27 FE) MG tablet  Commonly known as: FERGON   240 mg, Oral, Daily With Breakfast      folic acid 1 MG tablet  Commonly known as: FOLVITE   2 mg, Oral, Daily      furosemide 20 MG tablet  Commonly known as: LASIX   20 mg, Oral, Daily      HYDROcodone-acetaminophen 5-325 MG per tablet  Commonly known as: NORCO   1 tablet, Oral, Every 4 Hours PRN      ipratropium-albuterol  MCG/ACT inhaler  Commonly known as: COMBIVENT RESPIMAT   1 puff, Inhalation, 4 Times Daily PRN      multivitamin with minerals tablet tablet   1 tablet, Oral, Daily      polyethylene glycol 17 g packet  Commonly known as: MIRALAX   17 g, Oral, Daily PRN      senna 8.6 MG tablet  Commonly known as: SENOKOT   2 tablets, Oral, 2 Times Daily      sertraline 50 MG tablet  Commonly known as: ZOLOFT   50 mg, Oral, Daily      thiamine 100 MG tablet  tablet  Commonly known as: VITAMIN B-1   100 mg, Oral, Daily      tiZANidine 2 MG tablet  Commonly known as: ZANAFLEX   2 mg, Oral, Every 8 Hours PRN      Zinc Sulfate 220 (50 Zn) MG tablet   1 tablet, Oral, Daily             Stop These Medications      collagenase 250 UNIT/GM ointment     hydrocortisone 1 % lotion     potassium chloride 10 MEQ CR capsule  Commonly known as: MICRO-K              No Known Allergies      Discharge Disposition:  Condition:  Stable    Diet:   Diet Order   Procedures    Diet: Cardiac Diets; Healthy Heart (2-3 Na+); Texture: Regular Texture (IDDSI 7); Fluid Consistency: Thin (IDDSI 0)       Activity:   Activity Instructions       Activity as Tolerated      Other Activity Instructions      Activity Instructions: PT/OT to evaluate and treat    Up WIth Assist                CODE STATUS:    Code Status and Medical Interventions:   Ordered at: 10/30/23 1464     Code Status (Patient has no pulse and is not breathing):    CPR (Attempt to Resuscitate)     Medical Interventions (Patient has pulse or is breathing):    Full       No future appointments.  Additional Instructions for the Follow-ups that You Need to Schedule       Call MD With Problems / Concerns   As directed      Instructions: Call MD or return to ER if mental status changes/seizures/headache/focal neurological symptoms/worsening of the sacral decubitus/chest pain/fever or chills/shortness of breath    Order Comments: Instructions: Call MD or return to ER if mental status changes/seizures/headache/focal neurological symptoms/worsening of the sacral decubitus/chest pain/fever or chills/shortness of breath         Discharge Follow-up with PCP   As directed       Currently Documented PCP:    Linus Martinez MD    PCP Phone Number:    [phone]     Follow Up Details: Primary MD.  1 week.  Metabolic and toxic encephalopathy/sacral decubitus with soft tissue infection and sacral osteomyelitis/spinal cord injury with quadriplegia/anemia of chronic disease.        Discharge Follow-up with Specified Provider: Infectious disease; 2 Weeks   As directed      To: Infectious disease   Follow Up: 2 Weeks   Follow Up Details: Infected sacral decubitus with sacral osteomyelitis        Discharge Follow-up with Specified Provider: Wound clinic; 1 Month   As directed      To: Wound clinic   Follow Up: 1 Month   Follow Up Details: Chronic sacral decubitus complicated by infection and sacral  osteomyelitis               Contact information for follow-up providers       Linus Martinez MD .    Specialty: Internal Medicine  Why: Primary MD.  1 week.  Metabolic and toxic encephalopathy/sacral decubitus with soft tissue infection and sacral osteomyelitis/spinal cord injury with quadriplegia/anemia of chronic disease.  Contact information:  [address] [place] [address]  [phone]                       Contact information for after-discharge care       Destination       [facility] .    Service: Skilled Nursing  Contact information:  [address] [facility] [address]  [phone]                                     Time Spent on Discharge:  Greater than 30 minutes      Priscilla Fernandez MD  [facility]  11/06/23  10:29 EST

## 2023-11-06 NOTE — PROGRESS NOTES
"Physicians Statement of Medical Necessity for  Ambulance Transportation    GENERAL INFORMATION     Name: Misael Sharp  YOB: 1955  Medicare #: [id number]   Transport Date: 11/6/23 (Valid for round trips this date, or for scheduled repetitive trips for 60 days from the date signed below.)  Origin: [facility]  Destination: [facility]  Is the Patient's stay covered under Medicare Part A (PPS/DRG?)Yes  Closest appropriate facility? Yes  If this a hosp-hosp transfer? No  Is this a hospice patient? No    MEDICAL NECESSITY QUESTIONAIRE    Ambulance Transportation is medically necessary only if other means of transportation are contraindicated or would be potentially harmful to the patient.  To meet this requirement, the patient must be either \"bed confined\" or suffer from a condition such that transport by means other than an ambulance is contraindicated by the patient's condition.  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 No pertinent past medical history.   History reviewed. No pertinent surgical history.   2) Is this patient \"bed confined\" as defined below?Yes   To be \"bed confined\" the patient must satisfy all three of the following criteria:  (1) unable to get up from bed without assistance; AND (2) unable to ambulate;  AND (3) unable to sit in a chair or wheelchair.  3) Can this patient safely be transported by car or wheelchair van (I.e., may safely sit during transport, without an " attendant or monitoring?)No   4. In addition to completing questions 1-3 above, please check any of the following conditions that apply*:          *Note: supporting documentation for any boxes checked must be maintained in the patient's medical records Patient is confused, Unable to tolerate seated position for time needed to transport, and Unable to sit in a chair or wheelchair due to decubitus ulcers or other wounds      SIGNATURE OF PHYSICIAN OR OTHER AUTHORIZED HEALTHCARE PROFESSIONAL    I certify that the above information is true and correct based on my evaluation of this patient, and represent that the patient requires transport by ambulance and that other forms of transport are contraindicated.  I understand that this information will be used by the Centers for Medicare and Medicaid Services (CMS) to support the determiniation of medical necessity for ambulance services, and I represent that I have personal knowledge of the patient's condition at the time of transport.    X   If this box is checked, I also certify that the patient is physically or mentally incapable of signing the ambulance service's claim form and that the institution with which I am affiliated has furnished care, services or assistance to the patient.  My signature below is made on behalf of the patient pursuant to 42 .36(b)(4). In accordance with 42 .37, the specific reason(s) that the patient is physically or mentally incapable of signing the claim for is as follows:     Signature of Physician or Healthcare Professional  Date/Time:   11/6/23, 1120AM     (For Scheduled repetitive transport, this form is not valid for transports performed more than 60 days after this date).                                                                                                                                            --------------------------------------------------------------------------------------------  Printed Name and  Credentials of Physician or Authorized Healthcare Professional     *Form must be signed by patient's attending physician for scheduled, repetitive transports,.  For non-repetitive ambulance transports, if unable to obtain the signature of the attending physician, any of the following may sign (please select below):     Physician  Clinical Nurse Specialist  Registered Nurse     Physician Assistant  Discharge Planner  Licensed Practical Nurse     Nurse Practitioner

## 2023-11-06 NOTE — PLAN OF CARE
Problem: Skin Injury Risk Increased  Goal: Skin Health and Integrity  Outcome: Ongoing, Progressing  Intervention: Optimize Skin Protection  Recent Flowsheet Documentation  Taken 11/6/2023 0200 by Shyanne Peraza RN  Head of Bed (HOB) Positioning: HOB at 20-30 degrees  Taken 11/6/2023 0000 by Shyanne Peraza RN  Head of Bed (HOB) Positioning: HOB at 30 degrees  Taken 11/5/2023 2045 by Shyanne Peraza RN  Head of Bed (HOB) Positioning: HOB at 20-30 degrees     Problem: Skin Injury Risk Increased  Goal: Skin Health and Integrity  Intervention: Optimize Skin Protection  Recent Flowsheet Documentation  Taken 11/6/2023 0200 by Shyanne Peraza RN  Head of Bed (HOB) Positioning: HOB at 20-30 degrees  Taken 11/6/2023 0000 by Shyanne Peraza RN  Head of Bed (HOB) Positioning: HOB at 30 degrees  Taken 11/5/2023 2045 by Shyanne Peraza RN  Head of Bed (HOB) Positioning: HOB at 20-30 degrees   Goal Outcome Evaluation:

## 2023-11-06 NOTE — NURSING NOTE
Called [facility] and gave report to SULEMA Tolbert at this time. Informed LPN that EMS is scheduled for 1300 today.

## 2023-11-17 ENCOUNTER — OFFICE VISIT (OUTPATIENT)
Dept: WOUND CARE | Facility: HOSPITAL | Age: 68
End: 2023-11-17
Payer: MEDICARE

## 2023-11-17 PROCEDURE — G0463 HOSPITAL OUTPT CLINIC VISIT: HCPCS

## 2023-12-01 ENCOUNTER — OFFICE VISIT (OUTPATIENT)
Dept: WOUND CARE | Facility: HOSPITAL | Age: 68
End: 2023-12-01
Payer: MEDICARE

## 2023-12-01 PROCEDURE — G0463 HOSPITAL OUTPT CLINIC VISIT: HCPCS
